# Patient Record
Sex: FEMALE | Race: WHITE | Employment: OTHER | URBAN - METROPOLITAN AREA
[De-identification: names, ages, dates, MRNs, and addresses within clinical notes are randomized per-mention and may not be internally consistent; named-entity substitution may affect disease eponyms.]

---

## 2017-12-12 NOTE — PROGRESS NOTES
[facility]  [address] [place] [id number]  [phone]  Dept: [phone]    PRE-OP EVALUATION:  Today's date: 2017    Ariane Reilly (: 1975) presents for pre-operative evaluation assessment as requested by Dr. Krishna Carreon.  She requires evaluation and anesthesia risk assessment prior to undergoing surgery/procedure for treatment of RA in Right Ankle .  Proposed procedure: Right Subtalar Fusion    Date of Surgery/ Procedure: 2018  Time of Surgery/ Procedure: 7:30am  Hospital/Surgical Facility: [facility]  Fax number for surgical facility: [phone]  Primary Physician: Tasha Tran  Type of Anesthesia Anticipated: to be determined    Patient has a Health Care Directive or Living Will:  NO    Preop Questions 2017   1.  Do you have a history of heart attack, stroke, stent, bypass or surgery on an artery in the head, neck, heart or legs? No   2.  Do you ever have any pain or discomfort in your chest? No   3.  Do you have a history of  Heart Failure? No   4.   Are you troubled by shortness of breath when:  walking on a level surface, or up a slight hill, or at night? No   5.  Do you currently have a cold, bronchitis or other respiratory infection? No   6.  Do you have a cough, shortness of breath, or wheezing? No   7.  Do you sometimes get pains in the calves of your legs when you walk? No   8. Do you or anyone in your family have previous history of blood clots? YES - Father   9.  Do you or does anyone in your family have a serious bleeding problem such as prolonged bleeding following surgeries or cuts? No   10. Have you ever had problems with anemia or been told to take iron pills? No   11. Have you had any abnormal blood loss such as black, tarry or bloody stools, or abnormal vaginal bleeding? No   12. Have you ever had a blood transfusion? No   13. Have you or any of your relatives ever had problems with anesthesia? No   14. Do you have  sleep apnea, excessive snoring or daytime drowsiness? No   15. Do you have any prosthetic heart valves? No   16. Do you have prosthetic joints? No   17. Is there any chance that you may be pregnant? No           HPI:                                                      Brief HPI related to upcoming procedure: Pt is Going to have above surgery due to ongoing pain and failed conservative Treatment.      Pt has RA and sees mariano Reddy    MEDICAL HISTORY:                                                    Patient Active Problem List    Diagnosis Date Noted     Dysmenorrhea 11/30/2016     Priority: Medium     Prehypertension 09/08/2016     Priority: Medium     Rheumatoid arthritis of foot (H) 10/08/2015     Priority: Medium     Arthritis of left ankle 10/02/2015     Priority: Medium     Obesity 10/02/2015     Priority: Medium     Rheumatoid arthritis (H) 05/28/2004     Priority: Medium     Problem list name updated by automated process. Provider to review        Past Medical History:   Diagnosis Date     Arthritis     rheumatoid arthritis     Gastro-oesophageal reflux disease      PONV (postoperative nausea and vomiting)      Past Surgical History:   Procedure Laterality Date     ARTHRODESIS ANKLE Left 10/8/2015    Procedure: ARTHRODESIS ANKLE;  Surgeon: Krishna Segal MD;  Location: [facility] OR     GRAFT BONE FROM ILIAC CREST Left 10/8/2015    Procedure: GRAFT BONE FROM ILIAC CREST;  Surgeon: Krishna Segal MD;  Location: [facility] OR     HC TOOTH EXTRACTION W/FORCEP       ORTHOPEDIC SURGERY      SYNOVECTOMIES BILATERAL FEET, AND RIGHT HAND, RIGHT RADIAL LUNATE FUSION.     Current Outpatient Prescriptions   Medication Sig Dispense Refill     naproxen (NAPROSYN) 500 MG tablet Take 1 tablet (500 mg) by mouth 2 times daily (with meals) 60 tablet 1     levonorgestrel (MIRENA) 20 MCG/24HR IUD Use one device, intrauterine, for up to 5 years. 1 each 0     Tocilizumab (ACTEMRA SC) Inject 162 mg Subcutaneous One injection every two  weeks       ibuprofen (ADVIL,MOTRIN) 600 MG tablet Take 1 tablet (600 mg) by mouth every 6 hours as needed for other (inflammatory pain) 60 tablet 0     LEFLUNOMIDE PO Take 20 mg by mouth every other day        Biotin (BIOTIN MAXIMUM STRENGTH) 10 MG TABS tablet Take 10,000 mcg by mouth daily       OTC products: None, except as noted above    Allergies   Allergen Reactions     No Known Allergies       Latex Allergy: NO    Social History   Substance Use Topics     Smoking status: Never Smoker     Smokeless tobacco: Never Used     Alcohol use Yes      Comment: sporadically     History   Drug Use No     Social History     Social History     Marital status:      Spouse name: N/A     Number of children: N/A     Years of education: 17     Occupational History     dental hygenist      Social History Main Topics     Smoking status: Never Smoker     Smokeless tobacco: Never Used     Alcohol use Yes      Comment: sporadically     Drug use: No     Sexual activity: Not Currently     Partners: Male     Birth control/ protection: Abstinence, Natural Family Planning     Other Topics Concern     Parent/Sibling W/ Cabg, Mi Or Angioplasty Before 65f 55m? No     Social History Narrative       Family History   Problem Relation Age of Onset     Hypertension Mother      Hyperlipidemia Mother      Asthma Mother      DIABETES Father      Arthritis Father      Other Cancer Father      Basal Cell Carcinoma     Obesity Father      Cancer - colorectal Other      great uncle on her mother's side     DIABETES Brother      Hypertension Brother      Hyperlipidemia Brother      Thyroid Disease Brother      Hypo, cancer     Obesity Sister      Obesity Brother        REVIEW OF SYSTEMS:                                                    C: NEGATIVE for fever, chills, change in weight  I: NEGATIVE for worrisome rashes, moles or lesions  E: NEGATIVE for vision changes or irritation  E/M: NEGATIVE for ear, mouth and throat problems  R: NEGATIVE for  "significant cough or SOB  B: NEGATIVE for masses, tenderness or discharge  CV: NEGATIVE for chest pain, palpitations or peripheral edema  GI: NEGATIVE for nausea, abdominal pain, heartburn, or change in bowel habits  : NEGATIVE for frequency, dysuria, or hematuria  MUSCULOSKELETAL:arthralgias  N: NEGATIVE for weakness, dizziness or paresthesias  E: NEGATIVE for temperature intolerance, skin/hair changes  H: NEGATIVE for bleeding problems  P: NEGATIVE for changes in mood or affect    EXAM:                                                    /84 (BP Location: Left arm, Patient Position: Chair, Cuff Size: Adult Large)  Pulse 66  Temp 97.7  F (36.5  C) (Oral)  Ht 5' 9\" (1.753 m)  Wt 274 lb (124.3 kg)  LMP 11/21/2017  SpO2 100%  BMI 40.46 kg/m2    GENERAL APPEARANCE: healthy, alert and no distress     EYES: EOMI, PERRL     HENT: ear canals and TM's normal and nose and mouth without ulcers or lesions     NECK: no adenopathy, no asymmetry, masses, or scars and thyroid normal to palpation     RESP: lungs clear to auscultation - no rales, rhonchi or wheezes     CV: regular rates and rhythm, normal S1 S2, no S3 or S4 and no murmur, click or rub     ABDOMEN:  soft, nontender, no HSM or masses and bowel sounds normal     MS: extremities normal- no gross deformities noted, no evidence of inflammation in joints, FROM in all extremities.     SKIN: no suspicious lesions or rashes     NEURO: Normal strength and tone, sensory exam grossly normal, mentation intact and speech normal     PSYCH: mentation appears normal. and affect normal/bright     LYMPHATICS: No axillary, cervical, or supraclavicular nodes    DIAGNOSTICS:                                                    Labs pending     Recent Labs   Lab Test  09/22/16   0921  10/02/15   1009  09/17/10   0410   HGB  15.1  14.4   --    PLT  294  316   --    NA   --   141   --    POTASSIUM   --   4.3   --    CR   --   0.70  0.73      hgb 14.3  Liver test normal   UA is " pending   IMPRESSION:                                                    Reason for surgery/procedure: as above  Diagnosis/reason for consult: Preop    The proposed surgical procedure is considered INTERMEDIATE risk.    REVISED CARDIAC RISK INDEX  The patient has the following serious cardiovascular risks for perioperative complications such as (MI, PE, VFib and 3  AV Block):  No serious cardiac risks  INTERPRETATION: 0 risks: Class I (very low risk - 0.4% complication rate)    The patient has the following additional risks for perioperative complications:  No identified additional risks      ICD-10-CM    1. Preop general physical exam Z01.818        RECOMMENDATIONS:                                                        --Patient is to take all scheduled medications on the day of surgery EXCEPT for modifications listed below.    Anticoagulant or Antiplatelet Medication Use  NSAIDS: Naproxen (Naprosyn):   Stop 2-3 days prior to surgery        Preoperative Joint replacement and Rheumatologic DMARD Use  The pending procedure is an intervertebral disc arthroplasty or a total joint arthroplasty at the hip (ZACARIAS), knee (TKA), ankle shoulder, wrist or elbow.    --Leflunomide: Stop for 2 weeks before and 2 weeks after the procedure  --Tocilizumab: Stop for 2-4 weeks before and 2-4 weeks after the procedure          APPROVAL GIVEN to proceed with proposed procedure, without further diagnostic evaluation       Signed Electronically by: Tasha Tran MD    Copy of this evaluation report is provided to requesting physician.    [place] Preop Guidelines

## 2017-12-12 NOTE — PATIENT INSTRUCTIONS
[facility]    If you have any questions regarding to your visit please contact your care team:       Team Red:   Clinic Hours Telephone Number   Dr. Melany Armstrong  (pediatrics)  Sadie Ochoa NP 7am-7pm  Monday - Thursday   7am-5pm  Fridays  [phone] (fax)    Joan GIRON  [phone]   Urgent Care - [place] and [place] Monday-Friday  [place] - 11am-8pm  Saturday-Sunday  Both sites - 9am-5pm  [phone] - [facility]  [phone] - [place]       What options do I have for visits at the clinic other than the traditional office visit?  To expand how we care for you, many of our providers are utilizing electronic visits (e-visits) and telephone visits, when medically appropriate, for interactions with their patients rather than a visit in the clinic.   We also offer nurse visits for many medical concerns. Just like any other service, we will bill your insurance company for this type of visit based on time spent on the phone with your provider. Not all insurance companies cover these visits. Please check with your medical insurance if this type of visit is covered. You will be responsible for any charges that are not paid by your insurance.      E-visits via [employer]:  generally incur a $35.00 fee.  Telephone visits:  Time spent on the phone: *charged based on time that is spent on the phone in increments of 10 minutes. Estimated cost:   5-10 mins $30.00   11-20 mins. $59.00   21-30 mins. $85.00     As always, Thank you for trusting us with your health care needs!              Before Your Surgery      Call your surgeon if there is any change in your health. This includes signs of a cold or flu (such as a sore throat, runny nose, cough, rash or fever).    Do not smoke, drink alcohol or take over the counter medicine (unless your surgeon or primary care doctor tells you to) for the 24 hours before and after surgery.    If you take  prescribed drugs: Follow your doctor s orders about which medicines to take and which to stop until after surgery.    Eating and drinking prior to surgery: follow the instructions from your surgeon    Take a shower or bath the night before surgery. Use the soap your surgeon gave you to gently clean your skin. If you do not have soap from your surgeon, use your regular soap. Do not shave or scrub the surgery site.  Wear clean pajamas and have clean sheets on your bed.     Discharge GILSON Dixon CMA

## 2017-12-14 ENCOUNTER — OFFICE VISIT (OUTPATIENT)
Dept: FAMILY MEDICINE | Facility: CLINIC | Age: 42
End: 2017-12-14
Payer: COMMERCIAL

## 2017-12-14 ENCOUNTER — TRANSFERRED RECORDS (OUTPATIENT)
Dept: HEALTH INFORMATION MANAGEMENT | Facility: CLINIC | Age: 42
End: 2017-12-14

## 2017-12-14 VITALS
TEMPERATURE: 97.7 F | BODY MASS INDEX: 40.58 KG/M2 | HEART RATE: 66 BPM | DIASTOLIC BLOOD PRESSURE: 84 MMHG | WEIGHT: 274 LBS | SYSTOLIC BLOOD PRESSURE: 136 MMHG | OXYGEN SATURATION: 100 % | HEIGHT: 69 IN

## 2017-12-14 DIAGNOSIS — E66.01 MORBID OBESITY (H): ICD-10-CM

## 2017-12-14 DIAGNOSIS — Z23 NEED FOR PROPHYLACTIC VACCINATION AND INOCULATION AGAINST INFLUENZA: ICD-10-CM

## 2017-12-14 DIAGNOSIS — M06.9 RHEUMATOID ARTHRITIS, INVOLVING UNSPECIFIED SITE, UNSPECIFIED RHEUMATOID FACTOR PRESENCE: ICD-10-CM

## 2017-12-14 DIAGNOSIS — Z01.818 PREOP GENERAL PHYSICAL EXAM: Primary | ICD-10-CM

## 2017-12-14 LAB
ALBUMIN UR-MCNC: NEGATIVE MG/DL
ALT SERPL-CCNC: 25 IU/L (ref 5–35)
APPEARANCE UR: CLEAR
AST SERPL-CCNC: 16 U/L (ref 5–34)
BILIRUB UR QL STRIP: NEGATIVE
COLOR UR AUTO: YELLOW
CREAT SERPL-MCNC: 0.54 MG/DL (ref 0.5–1.3)
GFR SERPL CREATININE-BSD FRML MDRD: 134 ML/MIN/1.73M2
GLUCOSE UR STRIP-MCNC: NEGATIVE MG/DL
HGB UR QL STRIP: NEGATIVE
KETONES UR STRIP-MCNC: NEGATIVE MG/DL
LEUKOCYTE ESTERASE UR QL STRIP: NEGATIVE
NITRATE UR QL: NEGATIVE
PH UR STRIP: 8 PH (ref 5–7)
SOURCE: ABNORMAL
SP GR UR STRIP: 1.01 (ref 1–1.03)
UROBILINOGEN UR STRIP-ACNC: 0.2 EU/DL (ref 0.2–1)

## 2017-12-14 PROCEDURE — 81003 URINALYSIS AUTO W/O SCOPE: CPT | Performed by: FAMILY MEDICINE

## 2017-12-14 PROCEDURE — 90686 IIV4 VACC NO PRSV 0.5 ML IM: CPT | Performed by: FAMILY MEDICINE

## 2017-12-14 PROCEDURE — 90471 IMMUNIZATION ADMIN: CPT | Performed by: FAMILY MEDICINE

## 2017-12-14 PROCEDURE — 99214 OFFICE O/P EST MOD 30 MIN: CPT | Mod: 25 | Performed by: FAMILY MEDICINE

## 2017-12-14 NOTE — MR AVS SNAPSHOT
After Visit Summary   12/14/2017    Ariane Reilly    MRN: [id number]           Patient Information     Date Of Birth          1975        Visit Information        Provider Department      12/14/2017 2:00 PM Tasha Tran MD [facility]        Today's Diagnoses     Preop general physical exam    -  1    Rheumatoid arthritis, involving unspecified site, unspecified rheumatoid factor presence (H)          Care Instructions    [facility]    If you have any questions regarding to your visit please contact your care team:       Team Red:   Clinic Hours Telephone Number   Dr. Melany Armstrong  (pediatrics)  Sadie Ochoa NP 7am-7pm  Monday - Thursday   7am-5pm  Fridays  [phone] (fax)    Joan GIRON  [phone]   Urgent Care - [place] and [place] Monday-Friday  [place] - 11am-8pm  Saturday-Sunday  Both sites - 9am-5pm  [phone] - [facility]  [phone] - [place]       What options do I have for visits at the clinic other than the traditional office visit?  To expand how we care for you, many of our providers are utilizing electronic visits (e-visits) and telephone visits, when medically appropriate, for interactions with their patients rather than a visit in the clinic.   We also offer nurse visits for many medical concerns. Just like any other service, we will bill your insurance company for this type of visit based on time spent on the phone with your provider. Not all insurance companies cover these visits. Please check with your medical insurance if this type of visit is covered. You will be responsible for any charges that are not paid by your insurance.      E-visits via [employer]:  generally incur a $35.00 fee.  Telephone visits:  Time spent on the phone: *charged based on time that is spent on the phone in increments of 10 minutes. Estimated cost:   5-10 mins $30.00   11-20 mins. $59.00    21-30 mins. $85.00     As always, Thank you for trusting us with your health care needs!              Before Your Surgery      Call your surgeon if there is any change in your health. This includes signs of a cold or flu (such as a sore throat, runny nose, cough, rash or fever).    Do not smoke, drink alcohol or take over the counter medicine (unless your surgeon or primary care doctor tells you to) for the 24 hours before and after surgery.    If you take prescribed drugs: Follow your doctor s orders about which medicines to take and which to stop until after surgery.    Eating and drinking prior to surgery: follow the instructions from your surgeon    Take a shower or bath the night before surgery. Use the soap your surgeon gave you to gently clean your skin. If you do not have soap from your surgeon, use your regular soap. Do not shave or scrub the surgery site.  Wear clean pajamas and have clean sheets on your bed.     Discharge GILSON Dixon CMA            Follow-ups after your visit        Who to contact     If you have questions or need follow up information about today's clinic visit or your schedule please contact [facility] directly at [phone].  Normal or non-critical lab and imaging results will be communicated to you by [employer], letter or phone within 4 business days after the clinic has received the results. If you do not hear from us within 7 days, please contact the clinic through [employer] or phone. If you have a critical or abnormal lab result, we will notify you by phone as soon as possible.  Submit refill requests through [employer] or call your pharmacy and they will forward the refill request to us. Please allow 3 business days for your refill to be completed.          Additional Information About Your Visit        [employer] Information     [employer] gives you secure access to your electronic health record. If you see a primary care provider, you can also send messages to your  "care team and make appointments. If you have questions, please call your primary care clinic.  If you do not have a primary care provider, please call [phone] and they will assist you.        Care EveryWhere ID     This is your Care EveryWhere ID. This could be used by other organizations to access your [facility] medical records  [id number]        Your Vitals Were     Pulse Temperature Height Last Period Pulse Oximetry BMI (Body Mass Index)    66 97.7  F (36.5  C) (Oral) 5' 9\" (1.753 m) 11/21/2017 100% 40.46 kg/m2       Blood Pressure from Last 3 Encounters:   12/14/17 136/84   12/21/16 150/81   12/19/16 149/90    Weight from Last 3 Encounters:   12/14/17 274 lb (124.3 kg)   12/21/16 242 lb (109.8 kg)   12/19/16 242 lb 8.1 oz (110 kg)              We Performed the Following     *UA reflex to Microscopic and Culture ([place] and [facility] (except [place] and [place])        Primary Care Provider Office Phone # Fax #    Tasha Tran [phone] [address] [facility] [address]        Equal Access to Services     ALISHA SCHWAB : Hadii aad ku hadasho Soomaali, waaxda luqadaha, qaybta kaalmada adeegyada, justin bland. So [facility] [phone].    ATENCIÓN: Si habla español, tiene a levine disposición servicios gratuitos de asistencia lingüística. Llame al [phone].    We comply with applicable federal civil rights laws and [place] laws. We do not discriminate on the basis of race, color, national origin, age, disability, sex, sexual orientation, or gender identity.            Thank you!     Thank you for choosing [facility]  for your care. Our goal is always to provide you with excellent care. Hearing back from our patients is one way we can continue to improve our services. Please take a few minutes to complete the written survey that you may receive in the mail after your visit with us. Thank you!             Your Updated Medication " List - Protect others around you: Learn how to safely use, store and throw away your medicines at www.disposemymeds.org.          This list is accurate as of: 12/14/17  2:36 PM.  Always use your most recent med list.                   Brand Name Dispense Instructions for use Diagnosis    ACTEMRA SC      Inject 162 mg Subcutaneous One injection every two weeks        BIOTIN MAXIMUM STRENGTH 10 MG Tabs tablet   Generic drug:  Biotin      Take 10,000 mcg by mouth daily    Dry skin, Female infertility       ibuprofen 600 MG tablet    ADVIL/MOTRIN    60 tablet    Take 1 tablet (600 mg) by mouth every 6 hours as needed for other (inflammatory pain)    Rheumatoid arthritis of foot (H)       LEFLUNOMIDE PO      Take 20 mg by mouth every other day        levonorgestrel 20 MCG/24HR IUD    MIRENA    1 each    Use one device, intrauterine, for up to 5 years.    Encounter for IUD insertion       naproxen 500 MG tablet    NAPROSYN    60 tablet    Take 1 tablet (500 mg) by mouth 2 times daily (with meals)    Dysmenorrhea, Uterine cramping

## 2017-12-14 NOTE — NURSING NOTE
"Chief Complaint   Patient presents with     Pre-Op Exam       Initial /84 (BP Location: Left arm, Patient Position: Chair, Cuff Size: Adult Large)  Pulse 66  Temp 97.7  F (36.5  C) (Oral)  Ht 5' 9\" (1.753 m)  Wt 274 lb (124.3 kg)  LMP 11/21/2017  SpO2 100%  BMI 40.46 kg/m2 Estimated body mass index is 40.46 kg/(m^2) as calculated from the following:    Height as of this encounter: 5' 9\" (1.753 m).    Weight as of this encounter: 274 lb (124.3 kg).  Medication Reconciliation: complete    "

## 2017-12-15 NOTE — PROGRESS NOTES

## 2017-12-29 NOTE — H&P (VIEW-ONLY)
[facility]  [address] [place] [id number]  [phone]  Dept: [phone]    PRE-OP EVALUATION:  Today's date: 2017    Ariane Reilly (: 1975) presents for pre-operative evaluation assessment as requested by Dr. Krishna Carreon.  She requires evaluation and anesthesia risk assessment prior to undergoing surgery/procedure for treatment of RA in Right Ankle .  Proposed procedure: Right Subtalar Fusion    Date of Surgery/ Procedure: 2018  Time of Surgery/ Procedure: 7:30am  Hospital/Surgical Facility: [facility]  Fax number for surgical facility: [phone]  Primary Physician: Tasha Tran  Type of Anesthesia Anticipated: to be determined    Patient has a Health Care Directive or Living Will:  NO    Preop Questions 2017   1.  Do you have a history of heart attack, stroke, stent, bypass or surgery on an artery in the head, neck, heart or legs? No   2.  Do you ever have any pain or discomfort in your chest? No   3.  Do you have a history of  Heart Failure? No   4.   Are you troubled by shortness of breath when:  walking on a level surface, or up a slight hill, or at night? No   5.  Do you currently have a cold, bronchitis or other respiratory infection? No   6.  Do you have a cough, shortness of breath, or wheezing? No   7.  Do you sometimes get pains in the calves of your legs when you walk? No   8. Do you or anyone in your family have previous history of blood clots? YES - Father   9.  Do you or does anyone in your family have a serious bleeding problem such as prolonged bleeding following surgeries or cuts? No   10. Have you ever had problems with anemia or been told to take iron pills? No   11. Have you had any abnormal blood loss such as black, tarry or bloody stools, or abnormal vaginal bleeding? No   12. Have you ever had a blood transfusion? No   13. Have you or any of your relatives ever had problems with anesthesia? No   14. Do you have  sleep apnea, excessive snoring or daytime drowsiness? No   15. Do you have any prosthetic heart valves? No   16. Do you have prosthetic joints? No   17. Is there any chance that you may be pregnant? No           HPI:                                                      Brief HPI related to upcoming procedure: Pt is Going to have above surgery due to ongoing pain and failed conservative Treatment.      Pt has RA and sees mariano Reddy    MEDICAL HISTORY:                                                    Patient Active Problem List    Diagnosis Date Noted     Dysmenorrhea 11/30/2016     Priority: Medium     Prehypertension 09/08/2016     Priority: Medium     Rheumatoid arthritis of foot (H) 10/08/2015     Priority: Medium     Arthritis of left ankle 10/02/2015     Priority: Medium     Obesity 10/02/2015     Priority: Medium     Rheumatoid arthritis (H) 05/28/2004     Priority: Medium     Problem list name updated by automated process. Provider to review        Past Medical History:   Diagnosis Date     Arthritis     rheumatoid arthritis     Gastro-oesophageal reflux disease      PONV (postoperative nausea and vomiting)      Past Surgical History:   Procedure Laterality Date     ARTHRODESIS ANKLE Left 10/8/2015    Procedure: ARTHRODESIS ANKLE;  Surgeon: Krishna Segal MD;  Location: [facility] OR     GRAFT BONE FROM ILIAC CREST Left 10/8/2015    Procedure: GRAFT BONE FROM ILIAC CREST;  Surgeon: Krishna Segal MD;  Location: [facility] OR     HC TOOTH EXTRACTION W/FORCEP       ORTHOPEDIC SURGERY      SYNOVECTOMIES BILATERAL FEET, AND RIGHT HAND, RIGHT RADIAL LUNATE FUSION.     Current Outpatient Prescriptions   Medication Sig Dispense Refill     naproxen (NAPROSYN) 500 MG tablet Take 1 tablet (500 mg) by mouth 2 times daily (with meals) 60 tablet 1     levonorgestrel (MIRENA) 20 MCG/24HR IUD Use one device, intrauterine, for up to 5 years. 1 each 0     Tocilizumab (ACTEMRA SC) Inject 162 mg Subcutaneous One injection every two  weeks       ibuprofen (ADVIL,MOTRIN) 600 MG tablet Take 1 tablet (600 mg) by mouth every 6 hours as needed for other (inflammatory pain) 60 tablet 0     LEFLUNOMIDE PO Take 20 mg by mouth every other day        Biotin (BIOTIN MAXIMUM STRENGTH) 10 MG TABS tablet Take 10,000 mcg by mouth daily       OTC products: None, except as noted above    Allergies   Allergen Reactions     No Known Allergies       Latex Allergy: NO    Social History   Substance Use Topics     Smoking status: Never Smoker     Smokeless tobacco: Never Used     Alcohol use Yes      Comment: sporadically     History   Drug Use No     Social History     Social History     Marital status:      Spouse name: N/A     Number of children: N/A     Years of education: 17     Occupational History     dental hygenist      Social History Main Topics     Smoking status: Never Smoker     Smokeless tobacco: Never Used     Alcohol use Yes      Comment: sporadically     Drug use: No     Sexual activity: Not Currently     Partners: Male     Birth control/ protection: Abstinence, Natural Family Planning     Other Topics Concern     Parent/Sibling W/ Cabg, Mi Or Angioplasty Before 65f 55m? No     Social History Narrative       Family History   Problem Relation Age of Onset     Hypertension Mother      Hyperlipidemia Mother      Asthma Mother      DIABETES Father      Arthritis Father      Other Cancer Father      Basal Cell Carcinoma     Obesity Father      Cancer - colorectal Other      great uncle on her mother's side     DIABETES Brother      Hypertension Brother      Hyperlipidemia Brother      Thyroid Disease Brother      Hypo, cancer     Obesity Sister      Obesity Brother        REVIEW OF SYSTEMS:                                                    C: NEGATIVE for fever, chills, change in weight  I: NEGATIVE for worrisome rashes, moles or lesions  E: NEGATIVE for vision changes or irritation  E/M: NEGATIVE for ear, mouth and throat problems  R: NEGATIVE for  "significant cough or SOB  B: NEGATIVE for masses, tenderness or discharge  CV: NEGATIVE for chest pain, palpitations or peripheral edema  GI: NEGATIVE for nausea, abdominal pain, heartburn, or change in bowel habits  : NEGATIVE for frequency, dysuria, or hematuria  MUSCULOSKELETAL:arthralgias  N: NEGATIVE for weakness, dizziness or paresthesias  E: NEGATIVE for temperature intolerance, skin/hair changes  H: NEGATIVE for bleeding problems  P: NEGATIVE for changes in mood or affect    EXAM:                                                    /84 (BP Location: Left arm, Patient Position: Chair, Cuff Size: Adult Large)  Pulse 66  Temp 97.7  F (36.5  C) (Oral)  Ht 5' 9\" (1.753 m)  Wt 274 lb (124.3 kg)  LMP 11/21/2017  SpO2 100%  BMI 40.46 kg/m2    GENERAL APPEARANCE: healthy, alert and no distress     EYES: EOMI, PERRL     HENT: ear canals and TM's normal and nose and mouth without ulcers or lesions     NECK: no adenopathy, no asymmetry, masses, or scars and thyroid normal to palpation     RESP: lungs clear to auscultation - no rales, rhonchi or wheezes     CV: regular rates and rhythm, normal S1 S2, no S3 or S4 and no murmur, click or rub     ABDOMEN:  soft, nontender, no HSM or masses and bowel sounds normal     MS: extremities normal- no gross deformities noted, no evidence of inflammation in joints, FROM in all extremities.     SKIN: no suspicious lesions or rashes     NEURO: Normal strength and tone, sensory exam grossly normal, mentation intact and speech normal     PSYCH: mentation appears normal. and affect normal/bright     LYMPHATICS: No axillary, cervical, or supraclavicular nodes    DIAGNOSTICS:                                                    Labs pending     Recent Labs   Lab Test  09/22/16   0921  10/02/15   1009  09/17/10   0410   HGB  15.1  14.4   --    PLT  294  316   --    NA   --   141   --    POTASSIUM   --   4.3   --    CR   --   0.70  0.73      hgb 14.3  Liver test normal   UA is " pending   IMPRESSION:                                                    Reason for surgery/procedure: as above  Diagnosis/reason for consult: Preop    The proposed surgical procedure is considered INTERMEDIATE risk.    REVISED CARDIAC RISK INDEX  The patient has the following serious cardiovascular risks for perioperative complications such as (MI, PE, VFib and 3  AV Block):  No serious cardiac risks  INTERPRETATION: 0 risks: Class I (very low risk - 0.4% complication rate)    The patient has the following additional risks for perioperative complications:  No identified additional risks      ICD-10-CM    1. Preop general physical exam Z01.818        RECOMMENDATIONS:                                                        --Patient is to take all scheduled medications on the day of surgery EXCEPT for modifications listed below.    Anticoagulant or Antiplatelet Medication Use  NSAIDS: Naproxen (Naprosyn):   Stop 2-3 days prior to surgery        Preoperative Joint replacement and Rheumatologic DMARD Use  The pending procedure is an intervertebral disc arthroplasty or a total joint arthroplasty at the hip (ZACARIAS), knee (TKA), ankle shoulder, wrist or elbow.    --Leflunomide: Stop for 2 weeks before and 2 weeks after the procedure  --Tocilizumab: Stop for 2-4 weeks before and 2-4 weeks after the procedure          APPROVAL GIVEN to proceed with proposed procedure, without further diagnostic evaluation       Signed Electronically by: Tasha Tran MD    Copy of this evaluation report is provided to requesting physician.    [facility] Preop Guidelines

## 2018-01-04 ENCOUNTER — ANESTHESIA EVENT (OUTPATIENT)
Dept: SURGERY | Facility: CLINIC | Age: 43
End: 2018-01-04
Payer: COMMERCIAL

## 2018-01-04 ENCOUNTER — APPOINTMENT (OUTPATIENT)
Dept: GENERAL RADIOLOGY | Facility: CLINIC | Age: 43
End: 2018-01-04
Attending: ORTHOPAEDIC SURGERY
Payer: COMMERCIAL

## 2018-01-04 ENCOUNTER — ANESTHESIA (OUTPATIENT)
Dept: SURGERY | Facility: CLINIC | Age: 43
End: 2018-01-04
Payer: COMMERCIAL

## 2018-01-04 ENCOUNTER — SURGERY (OUTPATIENT)
Age: 43
End: 2018-01-04
Payer: COMMERCIAL

## 2018-01-04 ENCOUNTER — HOSPITAL ENCOUNTER (OUTPATIENT)
Facility: CLINIC | Age: 43
Discharge: HOME | End: 2018-01-05
Attending: ORTHOPAEDIC SURGERY | Admitting: ORTHOPAEDIC SURGERY
Payer: COMMERCIAL

## 2018-01-04 DIAGNOSIS — M05.9 RHEUMATOID ARTHRITIS WITH POSITIVE RHEUMATOID FACTOR, INVOLVING UNSPECIFIED SITE (H): Primary | ICD-10-CM

## 2018-01-04 PROBLEM — M06.9 RHEUMATOID ARTHRITIS, ADULT (H): Status: ACTIVE | Noted: 2018-01-04

## 2018-01-04 LAB — HCG UR QL: NEGATIVE

## 2018-01-04 PROCEDURE — 25000128 H RX IP 250 OP 636: Performed by: NURSE ANESTHETIST, CERTIFIED REGISTERED

## 2018-01-04 PROCEDURE — 40000986 XR FOOT PORT RT 2 VW: Mod: RT

## 2018-01-04 PROCEDURE — 40000935 ZZH STATISTIC OUTPATIENT (NON-OBS) EVE

## 2018-01-04 PROCEDURE — 81025 URINE PREGNANCY TEST: CPT | Performed by: ANESTHESIOLOGY

## 2018-01-04 PROCEDURE — 25000132 ZZH RX MED GY IP 250 OP 250 PS 637: Performed by: ORTHOPAEDIC SURGERY

## 2018-01-04 PROCEDURE — 25000125 ZZHC RX 250: Performed by: ANESTHESIOLOGY

## 2018-01-04 PROCEDURE — 37000008 ZZH ANESTHESIA TECHNICAL FEE, 1ST 30 MIN: Performed by: ORTHOPAEDIC SURGERY

## 2018-01-04 PROCEDURE — 27210995 ZZH RX 272: Performed by: ORTHOPAEDIC SURGERY

## 2018-01-04 PROCEDURE — 25000125 ZZHC RX 250: Performed by: ORTHOPAEDIC SURGERY

## 2018-01-04 PROCEDURE — 25000128 H RX IP 250 OP 636

## 2018-01-04 PROCEDURE — 40000934 ZZH STATISTIC OUTPATIENT (NON-OBS) DAY

## 2018-01-04 PROCEDURE — 25000128 H RX IP 250 OP 636: Performed by: ANESTHESIOLOGY

## 2018-01-04 PROCEDURE — 40000170 ZZH STATISTIC PRE-PROCEDURE ASSESSMENT II: Performed by: ORTHOPAEDIC SURGERY

## 2018-01-04 PROCEDURE — 25000125 ZZHC RX 250: Performed by: NURSE ANESTHETIST, CERTIFIED REGISTERED

## 2018-01-04 PROCEDURE — C1713 ANCHOR/SCREW BN/BN,TIS/BN: HCPCS | Performed by: ORTHOPAEDIC SURGERY

## 2018-01-04 PROCEDURE — 25000125 ZZHC RX 250

## 2018-01-04 PROCEDURE — 27210794 ZZH OR GENERAL SUPPLY STERILE: Performed by: ORTHOPAEDIC SURGERY

## 2018-01-04 PROCEDURE — 25000128 H RX IP 250 OP 636: Performed by: ORTHOPAEDIC SURGERY

## 2018-01-04 PROCEDURE — 27110028 ZZH OR GENERAL SUPPLY NON-STERILE: Performed by: ORTHOPAEDIC SURGERY

## 2018-01-04 PROCEDURE — 71000012 ZZH RECOVERY PHASE 1 LEVEL 1 FIRST HR: Performed by: ORTHOPAEDIC SURGERY

## 2018-01-04 PROCEDURE — 37000009 ZZH ANESTHESIA TECHNICAL FEE, EACH ADDTL 15 MIN: Performed by: ORTHOPAEDIC SURGERY

## 2018-01-04 PROCEDURE — 40000936 ZZH STATISTIC OUTPATIENT (NON-OBS) NIGHT

## 2018-01-04 PROCEDURE — 36000058 ZZH SURGERY LEVEL 3 EA 15 ADDTL MIN: Performed by: ORTHOPAEDIC SURGERY

## 2018-01-04 PROCEDURE — 36000056 ZZH SURGERY LEVEL 3 1ST 30 MIN: Performed by: ORTHOPAEDIC SURGERY

## 2018-01-04 DEVICE — IMPLANTABLE DEVICE: Type: IMPLANTABLE DEVICE | Site: FOOT | Status: FUNCTIONAL

## 2018-01-04 RX ORDER — EPHEDRINE SULFATE 50 MG/ML
INJECTION, SOLUTION INTRAMUSCULAR; INTRAVENOUS; SUBCUTANEOUS PRN
Status: DISCONTINUED | OUTPATIENT
Start: 2018-01-04 | End: 2018-01-04

## 2018-01-04 RX ORDER — CEFAZOLIN SODIUM 1 G/50ML
3 SOLUTION INTRAVENOUS
Status: COMPLETED | OUTPATIENT
Start: 2018-01-04 | End: 2018-01-04

## 2018-01-04 RX ORDER — KETOROLAC TROMETHAMINE 30 MG/ML
30 INJECTION, SOLUTION INTRAMUSCULAR; INTRAVENOUS EVERY 6 HOURS
Status: DISCONTINUED | OUTPATIENT
Start: 2018-01-04 | End: 2018-01-05 | Stop reason: HOSPADM

## 2018-01-04 RX ORDER — CEFAZOLIN SODIUM 2 G/100ML
2 INJECTION, SOLUTION INTRAVENOUS EVERY 8 HOURS
Status: DISCONTINUED | OUTPATIENT
Start: 2018-01-04 | End: 2018-01-05

## 2018-01-04 RX ORDER — PROCHLORPERAZINE MALEATE 10 MG
10 TABLET ORAL EVERY 6 HOURS PRN
Status: DISCONTINUED | OUTPATIENT
Start: 2018-01-04 | End: 2018-01-05 | Stop reason: HOSPADM

## 2018-01-04 RX ORDER — SODIUM CHLORIDE, SODIUM LACTATE, POTASSIUM CHLORIDE, CALCIUM CHLORIDE 600; 310; 30; 20 MG/100ML; MG/100ML; MG/100ML; MG/100ML
INJECTION, SOLUTION INTRAVENOUS CONTINUOUS
Status: DISCONTINUED | OUTPATIENT
Start: 2018-01-04 | End: 2018-01-05 | Stop reason: CLARIF

## 2018-01-04 RX ORDER — HYDROMORPHONE HYDROCHLORIDE 1 MG/ML
.3-.5 INJECTION, SOLUTION INTRAMUSCULAR; INTRAVENOUS; SUBCUTANEOUS EVERY 10 MIN PRN
Status: DISCONTINUED | OUTPATIENT
Start: 2018-01-04 | End: 2018-01-04 | Stop reason: HOSPADM

## 2018-01-04 RX ORDER — PROPOFOL 10 MG/ML
INJECTION, EMULSION INTRAVENOUS PRN
Status: DISCONTINUED | OUTPATIENT
Start: 2018-01-04 | End: 2018-01-04

## 2018-01-04 RX ORDER — METOCLOPRAMIDE 10 MG/1
10 TABLET ORAL EVERY 6 HOURS PRN
Status: DISCONTINUED | OUTPATIENT
Start: 2018-01-04 | End: 2018-01-05 | Stop reason: HOSPADM

## 2018-01-04 RX ORDER — ONDANSETRON 2 MG/ML
4 INJECTION INTRAMUSCULAR; INTRAVENOUS EVERY 30 MIN PRN
Status: DISCONTINUED | OUTPATIENT
Start: 2018-01-04 | End: 2018-01-04 | Stop reason: HOSPADM

## 2018-01-04 RX ORDER — BIOTIN 1 MG
5000 TABLET ORAL DAILY
Status: DISCONTINUED | OUTPATIENT
Start: 2018-01-04 | End: 2018-01-05 | Stop reason: CLARIF

## 2018-01-04 RX ORDER — FENTANYL CITRATE 50 UG/ML
25-50 INJECTION, SOLUTION INTRAMUSCULAR; INTRAVENOUS
Status: COMPLETED | OUTPATIENT
Start: 2018-01-04 | End: 2018-01-04

## 2018-01-04 RX ORDER — IBUPROFEN 600 MG/1
600 TABLET, FILM COATED ORAL EVERY 6 HOURS PRN
Qty: 60 TABLET | Refills: 0 | Status: SHIPPED | OUTPATIENT
Start: 2018-01-04

## 2018-01-04 RX ORDER — PROPOFOL 10 MG/ML
INJECTION, EMULSION INTRAVENOUS CONTINUOUS PRN
Status: DISCONTINUED | OUTPATIENT
Start: 2018-01-04 | End: 2018-01-04

## 2018-01-04 RX ORDER — DEXAMETHASONE SODIUM PHOSPHATE 4 MG/ML
INJECTION, SOLUTION INTRA-ARTICULAR; INTRALESIONAL; INTRAMUSCULAR; INTRAVENOUS; SOFT TISSUE PRN
Status: DISCONTINUED | OUTPATIENT
Start: 2018-01-04 | End: 2018-01-04

## 2018-01-04 RX ORDER — ONDANSETRON 4 MG/1
4 TABLET, ORALLY DISINTEGRATING ORAL EVERY 6 HOURS PRN
Qty: 30 TABLET | Refills: 0 | Status: SHIPPED | OUTPATIENT
Start: 2018-01-04 | End: 2018-09-20

## 2018-01-04 RX ORDER — OXYCODONE HYDROCHLORIDE 5 MG/1
5-10 TABLET ORAL
Status: DISCONTINUED | OUTPATIENT
Start: 2018-01-04 | End: 2018-01-05 | Stop reason: HOSPADM

## 2018-01-04 RX ORDER — NALOXONE HYDROCHLORIDE 0.4 MG/ML
.1-.4 INJECTION, SOLUTION INTRAMUSCULAR; INTRAVENOUS; SUBCUTANEOUS
Status: DISCONTINUED | OUTPATIENT
Start: 2018-01-04 | End: 2018-01-05 | Stop reason: HOSPADM

## 2018-01-04 RX ORDER — KETOROLAC TROMETHAMINE 30 MG/ML
30 INJECTION, SOLUTION INTRAMUSCULAR; INTRAVENOUS EVERY 6 HOURS
Status: DISCONTINUED | OUTPATIENT
Start: 2018-01-04 | End: 2018-01-04

## 2018-01-04 RX ORDER — SCOLOPAMINE TRANSDERMAL SYSTEM 1 MG/1
1 PATCH, EXTENDED RELEASE TRANSDERMAL
Status: DISCONTINUED | OUTPATIENT
Start: 2018-01-04 | End: 2018-01-04 | Stop reason: HOSPADM

## 2018-01-04 RX ORDER — ONDANSETRON 2 MG/ML
4 INJECTION INTRAMUSCULAR; INTRAVENOUS EVERY 6 HOURS PRN
Status: DISCONTINUED | OUTPATIENT
Start: 2018-01-04 | End: 2018-01-05 | Stop reason: HOSPADM

## 2018-01-04 RX ORDER — SODIUM CHLORIDE, SODIUM LACTATE, POTASSIUM CHLORIDE, CALCIUM CHLORIDE 600; 310; 30; 20 MG/100ML; MG/100ML; MG/100ML; MG/100ML
INJECTION, SOLUTION INTRAVENOUS CONTINUOUS
Status: DISCONTINUED | OUTPATIENT
Start: 2018-01-04 | End: 2018-01-04 | Stop reason: HOSPADM

## 2018-01-04 RX ORDER — HYDROXYZINE HYDROCHLORIDE 25 MG/1
25 TABLET, FILM COATED ORAL EVERY 6 HOURS PRN
Qty: 50 TABLET | Refills: 0 | Status: SHIPPED | OUTPATIENT
Start: 2018-01-04 | End: 2018-09-20

## 2018-01-04 RX ORDER — MEPERIDINE HYDROCHLORIDE 25 MG/ML
12.5 INJECTION INTRAMUSCULAR; INTRAVENOUS; SUBCUTANEOUS
Status: DISCONTINUED | OUTPATIENT
Start: 2018-01-04 | End: 2018-01-04 | Stop reason: HOSPADM

## 2018-01-04 RX ORDER — KETOROLAC TROMETHAMINE 30 MG/ML
INJECTION, SOLUTION INTRAMUSCULAR; INTRAVENOUS PRN
Status: DISCONTINUED | OUTPATIENT
Start: 2018-01-04 | End: 2018-01-04

## 2018-01-04 RX ORDER — ONDANSETRON 4 MG/1
4 TABLET, ORALLY DISINTEGRATING ORAL EVERY 6 HOURS PRN
Status: DISCONTINUED | OUTPATIENT
Start: 2018-01-04 | End: 2018-01-05 | Stop reason: HOSPADM

## 2018-01-04 RX ORDER — FENTANYL CITRATE 50 UG/ML
25-50 INJECTION, SOLUTION INTRAMUSCULAR; INTRAVENOUS
Status: DISCONTINUED | OUTPATIENT
Start: 2018-01-04 | End: 2018-01-04 | Stop reason: HOSPADM

## 2018-01-04 RX ORDER — BUPIVACAINE HYDROCHLORIDE 2.5 MG/ML
INJECTION, SOLUTION EPIDURAL; INFILTRATION; INTRACAUDAL PRN
Status: DISCONTINUED | OUTPATIENT
Start: 2018-01-04 | End: 2018-01-04 | Stop reason: HOSPADM

## 2018-01-04 RX ORDER — HYDROMORPHONE HYDROCHLORIDE 1 MG/ML
INJECTION, SOLUTION INTRAMUSCULAR; INTRAVENOUS; SUBCUTANEOUS
Status: COMPLETED
Start: 2018-01-04 | End: 2018-01-04

## 2018-01-04 RX ORDER — OXYCODONE HYDROCHLORIDE 5 MG/1
5-10 TABLET ORAL
Qty: 60 TABLET | Refills: 0 | Status: SHIPPED | OUTPATIENT
Start: 2018-01-04 | End: 2018-09-20

## 2018-01-04 RX ORDER — CEFAZOLIN SODIUM 1 G
1 VIAL (EA) INJECTION SEE ADMIN INSTRUCTIONS
Status: DISCONTINUED | OUTPATIENT
Start: 2018-01-04 | End: 2018-01-04

## 2018-01-04 RX ORDER — ONDANSETRON 4 MG/1
4 TABLET, ORALLY DISINTEGRATING ORAL EVERY 30 MIN PRN
Status: DISCONTINUED | OUTPATIENT
Start: 2018-01-04 | End: 2018-01-04 | Stop reason: HOSPADM

## 2018-01-04 RX ORDER — ASPIRIN 325 MG/1
325 TABLET, FILM COATED ORAL DAILY
Status: DISCONTINUED | OUTPATIENT
Start: 2018-01-05 | End: 2018-01-05 | Stop reason: HOSPADM

## 2018-01-04 RX ORDER — ONDANSETRON 2 MG/ML
INJECTION INTRAMUSCULAR; INTRAVENOUS PRN
Status: DISCONTINUED | OUTPATIENT
Start: 2018-01-04 | End: 2018-01-04

## 2018-01-04 RX ORDER — ACETAMINOPHEN 325 MG/1
650 TABLET ORAL EVERY 6 HOURS PRN
Status: DISCONTINUED | OUTPATIENT
Start: 2018-01-04 | End: 2018-01-05 | Stop reason: HOSPADM

## 2018-01-04 RX ORDER — NALOXONE HYDROCHLORIDE 0.4 MG/ML
.1-.4 INJECTION, SOLUTION INTRAMUSCULAR; INTRAVENOUS; SUBCUTANEOUS
Status: DISCONTINUED | OUTPATIENT
Start: 2018-01-04 | End: 2018-01-04 | Stop reason: HOSPADM

## 2018-01-04 RX ORDER — KETOROLAC TROMETHAMINE 30 MG/ML
30 INJECTION, SOLUTION INTRAMUSCULAR; INTRAVENOUS EVERY 6 HOURS PRN
Status: DISCONTINUED | OUTPATIENT
Start: 2018-01-04 | End: 2018-01-04

## 2018-01-04 RX ORDER — HYDROMORPHONE HYDROCHLORIDE 1 MG/ML
.3-.5 INJECTION, SOLUTION INTRAMUSCULAR; INTRAVENOUS; SUBCUTANEOUS
Status: DISCONTINUED | OUTPATIENT
Start: 2018-01-04 | End: 2018-01-04

## 2018-01-04 RX ORDER — METOCLOPRAMIDE HYDROCHLORIDE 5 MG/ML
10 INJECTION INTRAMUSCULAR; INTRAVENOUS EVERY 6 HOURS PRN
Status: DISCONTINUED | OUTPATIENT
Start: 2018-01-04 | End: 2018-01-05 | Stop reason: HOSPADM

## 2018-01-04 RX ORDER — LIDOCAINE HYDROCHLORIDE 20 MG/ML
INJECTION, SOLUTION INFILTRATION; PERINEURAL PRN
Status: DISCONTINUED | OUTPATIENT
Start: 2018-01-04 | End: 2018-01-04

## 2018-01-04 RX ORDER — IBUPROFEN 600 MG/1
600 TABLET, FILM COATED ORAL EVERY 6 HOURS PRN
Status: DISCONTINUED | OUTPATIENT
Start: 2018-01-04 | End: 2018-01-05 | Stop reason: HOSPADM

## 2018-01-04 RX ORDER — HYDROXYZINE HYDROCHLORIDE 25 MG/1
25 TABLET, FILM COATED ORAL EVERY 6 HOURS PRN
Status: DISCONTINUED | OUTPATIENT
Start: 2018-01-04 | End: 2018-01-05 | Stop reason: HOSPADM

## 2018-01-04 RX ORDER — HYDROMORPHONE HYDROCHLORIDE 1 MG/ML
.3-.5 INJECTION, SOLUTION INTRAMUSCULAR; INTRAVENOUS; SUBCUTANEOUS
Status: DISCONTINUED | OUTPATIENT
Start: 2018-01-04 | End: 2018-01-05 | Stop reason: HOSPADM

## 2018-01-04 RX ORDER — GLYCOPYRROLATE 0.2 MG/ML
INJECTION, SOLUTION INTRAMUSCULAR; INTRAVENOUS PRN
Status: DISCONTINUED | OUTPATIENT
Start: 2018-01-04 | End: 2018-01-04

## 2018-01-04 RX ORDER — ASPIRIN 325 MG/1
325 TABLET, FILM COATED ORAL DAILY
Qty: 60 EACH | Refills: 0 | Status: SHIPPED | OUTPATIENT
Start: 2018-01-05 | End: 2021-04-08

## 2018-01-04 RX ADMIN — Medication 5 MG: at 07:57

## 2018-01-04 RX ADMIN — SODIUM CHLORIDE, POTASSIUM CHLORIDE, SODIUM LACTATE AND CALCIUM CHLORIDE: 600; 310; 30; 20 INJECTION, SOLUTION INTRAVENOUS at 08:36

## 2018-01-04 RX ADMIN — SODIUM CHLORIDE, POTASSIUM CHLORIDE, SODIUM LACTATE AND CALCIUM CHLORIDE: 600; 310; 30; 20 INJECTION, SOLUTION INTRAVENOUS at 07:34

## 2018-01-04 RX ADMIN — PROPOFOL 30 MG: 10 INJECTION, EMULSION INTRAVENOUS at 09:44

## 2018-01-04 RX ADMIN — HYDROMORPHONE HYDROCHLORIDE 0.5 MG: 1 INJECTION, SOLUTION INTRAMUSCULAR; INTRAVENOUS; SUBCUTANEOUS at 12:19

## 2018-01-04 RX ADMIN — OXYCODONE HYDROCHLORIDE 10 MG: 5 TABLET ORAL at 17:06

## 2018-01-04 RX ADMIN — SCOPALAMINE 1 PATCH: 1 PATCH, EXTENDED RELEASE TRANSDERMAL at 07:17

## 2018-01-04 RX ADMIN — GENTAMICIN SULFATE 3000 ML: 40 INJECTION, SOLUTION INTRAMUSCULAR; INTRAVENOUS at 08:26

## 2018-01-04 RX ADMIN — GLYCOPYRROLATE 0.2 MG: 0.2 INJECTION, SOLUTION INTRAMUSCULAR; INTRAVENOUS at 07:40

## 2018-01-04 RX ADMIN — OXYCODONE HYDROCHLORIDE 10 MG: 5 TABLET ORAL at 13:05

## 2018-01-04 RX ADMIN — FENTANYL CITRATE 50 MCG: 50 INJECTION, SOLUTION INTRAMUSCULAR; INTRAVENOUS at 09:44

## 2018-01-04 RX ADMIN — Medication 0.5 MG: at 11:10

## 2018-01-04 RX ADMIN — ONDANSETRON 4 MG: 2 INJECTION INTRAMUSCULAR; INTRAVENOUS at 10:09

## 2018-01-04 RX ADMIN — PHENYLEPHRINE HYDROCHLORIDE 50 MCG: 10 INJECTION INTRAVENOUS at 09:03

## 2018-01-04 RX ADMIN — MIDAZOLAM 1 MG: 1 INJECTION INTRAMUSCULAR; INTRAVENOUS at 08:39

## 2018-01-04 RX ADMIN — BUPIVACAINE HYDROCHLORIDE 20 ML: 2.5 INJECTION, SOLUTION EPIDURAL; INFILTRATION; INTRACAUDAL at 08:50

## 2018-01-04 RX ADMIN — MIDAZOLAM 1 MG: 1 INJECTION INTRAMUSCULAR; INTRAVENOUS at 07:36

## 2018-01-04 RX ADMIN — Medication 3 G: at 10:09

## 2018-01-04 RX ADMIN — Medication 0.5 MG: at 14:25

## 2018-01-04 RX ADMIN — Medication 5 MG: at 08:05

## 2018-01-04 RX ADMIN — DEXMEDETOMIDINE HYDROCHLORIDE 20 MCG: 100 INJECTION, SOLUTION INTRAVENOUS at 09:55

## 2018-01-04 RX ADMIN — Medication 5 MG: at 08:51

## 2018-01-04 RX ADMIN — KETOROLAC TROMETHAMINE 30 MG: 30 INJECTION, SOLUTION INTRAMUSCULAR at 19:52

## 2018-01-04 RX ADMIN — Medication 5 MG: at 08:28

## 2018-01-04 RX ADMIN — PHENYLEPHRINE HYDROCHLORIDE 100 MCG: 10 INJECTION INTRAVENOUS at 09:23

## 2018-01-04 RX ADMIN — FENTANYL CITRATE 50 MCG: 50 INJECTION, SOLUTION INTRAMUSCULAR; INTRAVENOUS at 07:36

## 2018-01-04 RX ADMIN — DEXAMETHASONE SODIUM PHOSPHATE 4 MG: 4 INJECTION, SOLUTION INTRA-ARTICULAR; INTRALESIONAL; INTRAMUSCULAR; INTRAVENOUS; SOFT TISSUE at 07:57

## 2018-01-04 RX ADMIN — KETOROLAC TROMETHAMINE 30 MG: 30 INJECTION, SOLUTION INTRAMUSCULAR at 10:09

## 2018-01-04 RX ADMIN — THROMBIN, TOPICAL (BOVINE) 5000 UNITS: KIT at 09:04

## 2018-01-04 RX ADMIN — Medication 3 G: at 07:41

## 2018-01-04 RX ADMIN — LIDOCAINE HYDROCHLORIDE 100 MG: 20 INJECTION, SOLUTION INFILTRATION; PERINEURAL at 07:41

## 2018-01-04 RX ADMIN — Medication 0.5 MG: at 18:26

## 2018-01-04 RX ADMIN — HYDROMORPHONE HYDROCHLORIDE 0.5 MG: 1 INJECTION, SOLUTION INTRAMUSCULAR; INTRAVENOUS; SUBCUTANEOUS at 10:36

## 2018-01-04 RX ADMIN — PHENYLEPHRINE HYDROCHLORIDE 100 MCG: 10 INJECTION INTRAVENOUS at 09:30

## 2018-01-04 RX ADMIN — CEFAZOLIN SODIUM 2 G: 2 INJECTION, SOLUTION INTRAVENOUS at 17:08

## 2018-01-04 RX ADMIN — Medication 5 MG: at 07:44

## 2018-01-04 RX ADMIN — PHENYLEPHRINE HYDROCHLORIDE 100 MCG: 10 INJECTION INTRAVENOUS at 09:15

## 2018-01-04 RX ADMIN — KETOROLAC TROMETHAMINE 30 MG: 30 INJECTION, SOLUTION INTRAMUSCULAR at 14:22

## 2018-01-04 RX ADMIN — OXYCODONE HYDROCHLORIDE 10 MG: 5 TABLET ORAL at 23:46

## 2018-01-04 RX ADMIN — BUPIVACAINE HYDROCHLORIDE IN DEXTROSE 13.5 MG: 7.5 INJECTION, SOLUTION SUBARACHNOID at 07:41

## 2018-01-04 RX ADMIN — PHENYLEPHRINE HYDROCHLORIDE 50 MCG: 10 INJECTION INTRAVENOUS at 08:54

## 2018-01-04 RX ADMIN — PROPOFOL 100 MCG/KG/MIN: 10 INJECTION, EMULSION INTRAVENOUS at 07:41

## 2018-01-04 ASSESSMENT — PAIN DESCRIPTION - DESCRIPTORS
DESCRIPTORS: BURNING
DESCRIPTORS: BURNING

## 2018-01-04 NOTE — PROGRESS NOTES
1210PACU. A/O. Drsg CDI to right foot. CMS adeq. Toes pink & warm. Right foot elevated on pillow.Cast slightly moist. Pt c/o pain to Right hip-  Level 4/10.     Pt's family at bedside. Detailed update given.

## 2018-01-04 NOTE — ANESTHESIA PREPROCEDURE EVALUATION
Anesthesia Evaluation     . Pt has had prior anesthetic.     History of anesthetic complications   - PONV        ROS/MED HX    ENT/Pulmonary:      (-) sleep apnea   Neurologic:       Cardiovascular: Comment: preHTN -  No meds    (+) hypertension----. : . . . :. .       METS/Exercise Tolerance:     Hematologic:         Musculoskeletal:   (+) arthritis, , , -       GI/Hepatic:     (+) GERD Asymptomatic on medication,       Renal/Genitourinary:         Endo:     (+) Obesity, .   (-) Type I DM   Psychiatric:         Infectious Disease:         Malignancy:         Other:                     Physical Exam  Normal systems: dental    Airway   Mallampati: II  TM distance: >3 FB  Neck ROM: full    Dental     Cardiovascular   Rhythm and rate: regular and normal      Pulmonary    breath sounds clear to auscultation                    Anesthesia Plan      History & Physical Review  History and physical reviewed and following examination; no interval change.    ASA Status:  3 .    NPO Status:  > 8 hours    Plan for Spinal with Intravenous induction. Maintenance will be Balanced.    PONV prophylaxis:  Ondansetron (or other 5HT-3) and Scopolamine patch       Postoperative Care      Consents  Anesthetic plan, risks, benefits and alternatives discussed with:  Patient..                          .

## 2018-01-04 NOTE — ANESTHESIA PROCEDURE NOTES
Peripheral nerve/Neuraxial procedure note : intrathecal  Pre-Procedure  Performed by NEREIDA CODY  Location: OR      Pre-Anesthestic Checklist: patient identified, IV checked, risks and benefits discussed, informed consent, monitors and equipment checked and pre-op evaluation    Timeout  Correct Patient: Yes   Correct Procedure: Yes   Correct Site: Yes   Correct Laterality: N/A   Correct Position: Yes   Site Marked: N/A   .   Procedure Documentation    .    Procedure:    Intrathecal.  Insertion Site:L3-4  (midline approach)      Patient Prep;povidone-iodine 7.5% surgical scrub.  .  Needle: Aj MosleytieFernando Spinal Needle (gauge): 24  Spinal/LP Needle Length (inches): 5 # of attempts: 1 and # of redirects:  .       Assessment/Narrative  Paresthesias: No.  .  .  clear CSF fluid removed . Comments:  Subarachnoid Block  1.8 ml 0.75% bupivacaine with dextrose and 100 mcg epinephrine

## 2018-01-04 NOTE — OP NOTE
DATE OF PROCEDURE:  01/04/2018       PREOPERATIVE DIAGNOSIS:  Rheumatoid arthritis, right subtalar joint.      POSTOPERATIVE DIAGNOSIS:  Rheumatoid arthritis, right subtalar joint.      PROCEDURE:   1.  Right iliac crest bone graft.   2.  Right subtalar arthrodesis.      DESCRIPTION OF PROCEDURE:  After adequate spinal anesthetic was obtained, Ariane Reilly's right iliac crest and right foot and ankle were prepped and draped in usual sterile fashion.  Thigh tourniquet was utilized.  We turned our attention first to obtainment of the iliac crest bone graft.  Curvilinear incision was made over the anterior iliac crest.  This was carried down through the subcutaneous tissue.  Fascia was incised in line with the skin incision, subperiosteally stripped off the dorsal aspect of the crest.  Utilizing the Micro-Aire microsagittal saw, a dorsal cortical window was made and elevated.  Cancellous bone graft was then removed.  Wound was thoroughly irrigated with antibiotic solution.  Thrombin-soaked Gelfoam was placed in the bicortical cavity.  Dorsal cortical window was closed and impacted.  Fascia was closed with interrupted #1 Vicryl, subcutaneous tissue closed in layers with interrupted #1, running 0 and 2-0 Vicryl, staples for the skin.  0.25% Marcaine was instilled along the incision line, a temporary sterile dressing applied.      We then turned our attention to the main portion of the procedure.  Limb was exsanguinated, tourniquet raised.  A dorsal longitudinal skin incision was made over the talonavicular joint at the tibialis anterior, carried down through the subcutaneous tissue, protecting the neurovascular bundle laterally.  Soft tissue was subperiosteally stripped off of the dorsal aspect of the talar neck.  We then turned our attention laterally, where a longitudinal skin incision was made from the distal aspect of the fibula to the anterior process of the calcaneus, carried down through the subcutaneous  tissue.  Sinus tarsi was entered.  Severe arthritic changes were noted.  Utilizing osteotomes, curet and rongeur, the middle and posterior facets were decorticated.  Wound was thoroughly irrigated with antibiotic solution.  Previously obtained iliac crest bone graft was then placed.  With the hindfoot held in correct, slightly valgus position, 2 diverging guide pins were placed from the talus into the calcaneus.  Appropriate screws were chosen, countersunk and placed.  There was excellent fixation and alignment.  This was confirmed with intraoperative radiograph.  Wounds were thoroughly irrigated with antibiotic solution and closed in layers utilizing running 0 and 2-0 Vicryl, 3-0 Prolene vertical mattress stitch for the skin.  Marcaine 0.25% was instilled along the incision line.  Sterile Marito Zapata dressing was applied.  Tourniquet was released after 72 minutes, and with good capillary refill, the patient was taken to the recovery room in good condition.         KRISHNA ARMSTRONG MD             D: 2018 10:22   T: 2018 15:55   MT: [id number]      Name:     CARLENE REYNOLDS   MRN:      [id number]        Account:        [id number]   :      1975           Procedure Date: 2018      Document: [id number]       cc: Krishna Armstrong MD

## 2018-01-04 NOTE — IP AVS SNAPSHOT
[facility] Observation Unit    [address] [place] [id number]    Phone:  [phone]                                       After Visit Summary   1/4/2018    Ariane Reilly    MRN: [id number]           After Visit Summary Signature Page     I have received my discharge instructions, and my questions have been answered. I have discussed any challenges I see with this plan with the nurse or doctor.    ..........................................................................................................................................  Patient/Patient Representative Signature      ..........................................................................................................................................  Patient Representative Print Name and Relationship to Patient    ..................................................               ................................................  Date                                            Time    ..........................................................................................................................................  Reviewed by Signature/Title    ...................................................              ..............................................  Date                                                            Time

## 2018-01-04 NOTE — PROGRESS NOTES
Admission medication history interview status for the 1/4/2018  admission is complete. See EPIC admission navigator for prior to admission medications     Medication history source reliability:Good    Medication history interview source(s):Patient    Medication history resources (including written lists, pill bottles, clinic record):None    Primary pharmacy.[facility]    Additional medication history information not noted on PTA med list :None    Time spent in this activity: 45 MINUTES    Prior to Admission medications    Medication Sig Last Dose Taking? Auth Provider   BIOTIN PO Take 5,000 mcg by mouth daily 12/28/2017 Yes Reported, Patient   IBUPROFEN PO Take 200-800 mg by mouth 3 times daily as needed for moderate pain 12/27/2017 at prn Yes Reported, Patient   NAPROXEN PO Take 500 mg by mouth 2 times daily as needed for moderate pain MORE THAN WEEK at PRN Yes Reported, Patient   VITAMIN D, CHOLECALCIFEROL, PO Take 2,000 Units by mouth daily 12/28/2017 Yes Reported, Patient   fluticasone-salmeterol (ADVAIR) 250-50 MCG/DOSE diskus inhaler Inhale 1 puff into the lungs 2 times daily as needed 12/14/2017 at PRN Yes Reported, Patient   levonorgestrel (MIRENA) 20 MCG/24HR IUD Use one device, intrauterine, for up to 5 years. IN PLACE Yes Andrew Crockett MD   Tocilizumab (ACTEMRA SC) Inject 162 mg Subcutaneous every 14 days One injection every two weeks  12/27/2017 Yes Reported, Patient   LEFLUNOMIDE PO Take 20 mg by mouth every other day  12/27/2017 Yes Reported, Patient

## 2018-01-04 NOTE — ANESTHESIA CARE TRANSFER NOTE
Patient: Ariane Reilly    Procedure(s):  RIGHT SUBTALAR FUSION WITH RIGHT ILIAC CREST BONE GRAFTING  - Wound Class: I-Clean      Diagnosis: DJD RIGHT SUBTALAR JOINT   Diagnosis Additional Information: No value filed.    Anesthesia Type:   Spinal     Note:  Airway :Room Air  Patient transferred to:PACU  Comments: Level of Conscious:Awake  Vital Signs   BP:113/62   HR:57   RR:12   O2 Saturation:93   Oxygen LPM:Room Air   Temp:36.3  Dentition:Unchanged from preop  Patient Status:Stable  Report to PACU RN.Handoff Report: Identifed the Patient, Identified the Reponsible Provider, Reviewed the pertinent medical history, Discussed the surgical course, Reviewed Intra-OP anesthesia mangement and issues during anesthesia, Set expectations for post-procedure period and Allowed opportunity for questions and acknowledgement of understanding      Vitals: (Last set prior to Anesthesia Care Transfer)    CRNA VITALS  1/4/2018 1008 - 1/4/2018 1046      1/4/2018             Pulse: 67    SpO2: 92 %                Electronically Signed By: Christine Marie Volp Hodgkins, CRNA, APRN CRNA  January 4, 2018  10:46 AM

## 2018-01-04 NOTE — PROVIDER NOTIFICATION
Dr. Segal notified of poor pain control to right foot, pain decreasing after toradol and dilaudid given @1424. VSS, Right foot elevated and blue foam pillow, CMS intact, on RA.

## 2018-01-04 NOTE — DISCHARGE INSTRUCTIONS
"Post-Operative Instructions Hindfoot/Ankle/Tendon Surgery Patients  Krishna Segal M.D.    Board Certified  Fellowship, Foot and Ankle Surgery  Member, American Academy of Orthopaedic Surgeons  American Orthopaedic Foot and Ankle Society    Direct Phone 9:00am to 5:00pm [phone]  After Hours/24 Hour On Call [phone]    Diet:  ? Take all prescribed medications with food   ? Narcotic pain medication can cause constipation  ? Avoid alcoholic beverages while taking pain medications   ? Increase dietary fiber protein rich foods, fluids post operatively    Activity:  ? Elevate operative foot 90% of the time.  ? \"Swelling runs downhill\" - the more you elevate, the less permanent swelling you will experience  ? Strict non-weight bearing on operative leg.  Crutches, walker, scooter or wheelchair must be used for mobilization.  ? You may be up to the bathroom, to the kitchen for meals and to change locations in the house.  ? You may do sit-ups (NOT BONE GRAFT PATIENTS), leg raises, upper body exercises  ? Adjust your immobilized foot/ankle to relieve pressure on your heel    Special Care Instructions:     ? DO NOT CHANGE OR ALTER THE CAST  ? Keep your cast/splint dry  ? Sponge bath or wrap seal your cast for shower  ? If you have an ILIAC CREST BONE GRAFT, No shower until staples removed   ? Change hip dressing daily:    ? Clean incision with hydrogen peroxide   ? Cover with dry sterile gauze and minimal tape  ? It is not unusual to have some \"numbness\" in foot and ankle following surgery    Report to your Doctor if any of the following occur:  ? Elevated temperature, 101o or higher  ? Increased pain unrelieved by pain medication and/or elevation  ? Tight/loose/wet cast  ? Increased swelling in foot or hip  ? Calf pain  ? Hip graft site drainage  ? For any shortness of breath, DIAL 911, go to the Emergency Room  Medications:  ? Take all of the following medications with food.    ? Aspirin/Ecotrin 325 mg.:  1 " tab 1x/day  ? This is for blood clot prevention  ? If you have a sensitive stomach, Ecotrin can be less irritating  ? If you experience any unusual bruising or bleeding or ringing in the ears, stop this medication and call us  ? Oxycodone  1-2 every 3-4 hours as needed for pain  o You may take 1 tablet with plain Tylenol or Ibuprofen for less severe pain or to decrease nausea/mental effect  ?  Hydrocodone  1-2 every 3-4 hours as needed for pain  o You may take 1 tablet with plain Tylenol or Ibuprofen for less severe pain or to decrease nausea/mental effects  ? Ibuprofen 1 every 6 hours as needed for inflammatory pain        Your Next Appointment:    ? Appt. scheduled for _____Wed 1/17/18____1:15 pm______________________        Direct Phone 9:00am to 5:00pm [phone]    After Hours/24 Hour On Call [phone]          Information for Patients Discharging with a Transderm Scopolamine Patch       Dry mouth is a common side effect.    Drowsiness is another common side effect especially when combined with pain medication.  Please avoid activities that require mental alertness such as driving a car or making important legal decisions.    Since Scopolamine can cause temporary dilation of the pupils and blurred vision if it comes in contact with the eyes; be sure to wash your hands thoroughly with soap and water immediately after handling the patch.   When you remove your patch, please stick it to a tissue or paper towel for disposal.      Remove the patch immediately and contact a physician in the unlikely event that you experience symptoms of acute glaucoma (pain and reddening of the eyes, accompanied by dilated pupils).    Remove the patch if you develop any difficulties urinating.  If you cannot urinate after removing your patch, please notify your surgeon.    Remove the patch 24 hours after surgery.

## 2018-01-04 NOTE — IP AVS SNAPSHOT
MRN:[id number]                      After Visit Summary   1/4/2018    Ariane Reilly    MRN: [id number]           Thank you!     Thank you for choosing [facility] for your care. Our goal is always to provide you with excellent care. Hearing back from our patients is one way we can continue to improve our services. Please take a few minutes to complete the written survey that you may receive in the mail after you visit with us. Thank you!        Patient Information     Date Of Birth          1975        Designated Caregiver       Most Recent Value    Caregiver    Will someone help with your care after discharge? yes    Name of designated caregiver - CHARLES- [phone]    Phone number of caregiver [phone]    Caregiver address [address] [place] [address]      About your hospital stay     You were admitted on:  January 4, 2018 You last received care in the:  [facility]    You were discharged on:  January 5, 2018       Who to Call     For medical emergencies, please call 911.  For non-urgent questions about your medical care, please call your primary care provider or clinic, [phone]  For questions related to your surgery, please call your surgery clinic        Attending Provider     Provider Specialty    Krishna Segal MD Orthopedics       Primary Care Provider Office Phone # Fax #    Tasha Tran [phone]      Future tests that were ordered for you     WOUND CARE SUPPLIES       CHANGE ILIAC CREST SITE DAILY, CLEAN WITH H202                  Further instructions from your care team       Post-Operative Instructions Hindfoot/Ankle/Tendon Surgery Patients  Krishna Segal M.D.    Board Certified  Fellowship, Foot and Ankle Surgery  Member, American Academy of Orthopaedic Surgeons  American Orthopaedic Foot and Ankle Society    Direct Phone 9:00am to 5:00pm [phone]  After Hours/24 Hour On Call [phone]  [phone]    Diet:  ? Take all prescribed medications with food   ? Narcotic pain medication can cause constipation  ? Avoid alcoholic beverages while taking pain medications   ? Increase dietary fiber protein rich foods, fluids post operatively    Activity:  ? Elevate operative foot 90% of the time.  ? \"Swelling runs downhill\" - the more you elevate, the less permanent swelling you will experience  ? Strict non-weight bearing on operative leg.  Crutches, walker, scooter or wheelchair must be used for mobilization.  ? You may be up to the bathroom, to the kitchen for meals and to change locations in the house.  ? You may do sit-ups (NOT BONE GRAFT PATIENTS), leg raises, upper body exercises  ? Adjust your immobilized foot/ankle to relieve pressure on your heel    Special Care Instructions:     ? DO NOT CHANGE OR ALTER THE CAST  ? Keep your cast/splint dry  ? Sponge bath or wrap seal your cast for shower  ? If you have an ILIAC CREST BONE GRAFT, No shower until staples removed   ? Change hip dressing daily:    ? Clean incision with hydrogen peroxide   ? Cover with dry sterile gauze and minimal tape  ? It is not unusual to have some \"numbness\" in foot and ankle following surgery    Report to your Doctor if any of the following occur:  ? Elevated temperature, 101o or higher  ? Increased pain unrelieved by pain medication and/or elevation  ? Tight/loose/wet cast  ? Increased swelling in foot or hip  ? Calf pain  ? Hip graft site drainage  ? For any shortness of breath, DIAL 911, go to the Emergency Room  Medications:  ? Take all of the following medications with food.    ? Aspirin/Ecotrin 325 mg.:  1 tab 1x/day  ? This is for blood clot prevention  ? If you have a sensitive stomach, Ecotrin can be less irritating  ? If you experience any unusual bruising or bleeding or ringing in the ears, stop this medication and call us  ? Oxycodone  1-2 every 3-4 hours as needed for pain  o You may take 1 tablet with plain Tylenol or " "Ibuprofen for less severe pain or to decrease nausea/mental effect  ?  Hydrocodone  1-2 every 3-4 hours as needed for pain  o You may take 1 tablet with plain Tylenol or Ibuprofen for less severe pain or to decrease nausea/mental effects  ? Ibuprofen 1 every 6 hours as needed for inflammatory pain        Your Next Appointment:    ? Appt. scheduled for _____Wed 1/17/18____1:15 pm______________________        Direct Phone 9:00am to 5:00pm [phone]    After Hours/24 Hour On Call [phone]          Information for Patients Discharging with a Transderm Scopolamine Patch       Dry mouth is a common side effect.    Drowsiness is another common side effect especially when combined with pain medication.  Please avoid activities that require mental alertness such as driving a car or making important legal decisions.    Since Scopolamine can cause temporary dilation of the pupils and blurred vision if it comes in contact with the eyes; be sure to wash your hands thoroughly with soap and water immediately after handling the patch.   When you remove your patch, please stick it to a tissue or paper towel for disposal.      Remove the patch immediately and contact a physician in the unlikely event that you experience symptoms of acute glaucoma (pain and reddening of the eyes, accompanied by dilated pupils).    Remove the patch if you develop any difficulties urinating.  If you cannot urinate after removing your patch, please notify your surgeon.    Remove the patch 24 hours after surgery.      Pending Results     No orders found for last 3 day(s).            Admission Information     Date & Time Provider Department Dept. Phone    1/4/2018 Krishna Segal MD [facility] [phone]      Your Vitals Were     Blood Pressure Temperature Respirations Height Weight Last Period    120/58 (BP Location: Right arm) 97.2  F (36.2  C) (Oral) 16 1.778 m (5' 10\") 123.6 kg (272 lb 6.4 oz) 12/25/2017    Pulse " Oximetry BMI (Body Mass Index)                94% 39.09 kg/m2          MyChart Information     [facility] gives you secure access to your electronic health record. If you see a primary care provider, you can also send messages to your care team and make appointments. If you have questions, please call your primary care clinic.  If you do not have a primary care provider, please call [phone] and they will assist you.        Care EveryWhere ID     This is your Care EveryWhere ID. This could be used by other organizations to access your [facility] medical records  [id number]        Equal Access to Services     [facility]: Hadsarah Shepard, wamika heller, alycia sandersalvidal oseguera, justin krause . So [facility] [phone].    ATENCIÓN: Si habla español, tiene a levine disposición servicios gratuitos de asistencia lingüística. Lucas al [phone].    We comply with applicable federal civil rights laws and [place] laws. We do not discriminate on the basis of race, color, national origin, age, disability, sex, sexual orientation, or gender identity.               Review of your medicines      START taking        Dose / Directions    aspirin - buffered 325 MG Tabs tablet   Commonly known as:  ASCRIPTIN        Dose:  325 mg   Take 1 tablet (325 mg) by mouth daily   Quantity:  60 each   Refills:  0       hydrOXYzine 25 MG tablet   Commonly known as:  ATARAX        Dose:  25 mg   Take 1 tablet (25 mg) by mouth every 6 hours as needed for other (adjuvant pain)   Quantity:  50 tablet   Refills:  0       ondansetron 4 MG ODT tab   Commonly known as:  ZOFRAN-ODT        Dose:  4 mg   Take 1 tablet (4 mg) by mouth every 6 hours as needed for nausea or vomiting   Quantity:  30 tablet   Refills:  0       oxyCODONE IR 5 MG tablet   Commonly known as:  ROXICODONE        Dose:  5-10 mg   Take 1-2 tablets (5-10 mg) by mouth every 3 hours as needed for other (pain control or improvement in  physical function. Hold dose for analgesic side effects.)   Quantity:  60 tablet   Refills:  0         CONTINUE these medicines which may have CHANGED, or have new prescriptions. If we are uncertain of the size of tablets/capsules you have at home, strength may be listed as something that might have changed.        Dose / Directions    * IBUPROFEN PO   This may have changed:  Another medication with the same name was added. Make sure you understand how and when to take each.        Dose:  200-800 mg   Take 200-800 mg by mouth 3 times daily as needed for moderate pain   Refills:  0       * ibuprofen 600 MG tablet   Commonly known as:  ADVIL/MOTRIN   This may have changed:  You were already taking a medication with the same name, and this prescription was added. Make sure you understand how and when to take each.        Dose:  600 mg   Take 1 tablet (600 mg) by mouth every 6 hours as needed for other (inflammatory pain)   Quantity:  60 tablet   Refills:  0       * Notice:  This list has 2 medication(s) that are the same as other medications prescribed for you. Read the directions carefully, and ask your doctor or other care provider to review them with you.      CONTINUE these medicines which have NOT CHANGED        Dose / Directions    BIOTIN PO        Dose:  5000 mcg   Take 5,000 mcg by mouth daily   Refills:  0       fluticasone-salmeterol 250-50 MCG/DOSE diskus inhaler   Commonly known as:  ADVAIR        Dose:  1 puff   Inhale 1 puff into the lungs 2 times daily as needed   Refills:  0       levonorgestrel 20 MCG/24HR IUD   Commonly known as:  MIRENA   Used for:  Encounter for IUD insertion        Use one device, intrauterine, for up to 5 years.   Quantity:  1 each   Refills:  0       VITAMIN D (CHOLECALCIFEROL) PO        Dose:  2000 Units   Take 2,000 Units by mouth daily   Refills:  0         STOP taking     ACTEMRA SC           LEFLUNOMIDE PO           NAPROXEN PO                Where to get your medicines       These medications were sent to [facility] [place] - [address]  [address] [place] [address]     Phone:  [phone]     aspirin - buffered 325 MG Tabs tablet    hydrOXYzine 25 MG tablet    ibuprofen 600 MG tablet    ondansetron 4 MG ODT tab         Some of these will need a paper prescription and others can be bought over the counter. Ask your nurse if you have questions.     Bring a paper prescription for each of these medications     oxyCODONE IR 5 MG tablet                Protect others around you: Learn how to safely use, store and throw away your medicines at www.disposemymeds.org.             Medication List: This is a list of all your medications and when to take them. Check marks below indicate your daily home schedule. Keep this list as a reference.      Medications           Morning Afternoon Evening Bedtime As Needed    aspirin - buffered 325 MG Tabs tablet   Commonly known as:  ASCRIPTIN   Take 1 tablet (325 mg) by mouth daily   Last time this was given:  325 mg on 1/5/2018  9:55 AM                                BIOTIN PO   Take 5,000 mcg by mouth daily                                fluticasone-salmeterol 250-50 MCG/DOSE diskus inhaler   Commonly known as:  ADVAIR   Inhale 1 puff into the lungs 2 times daily as needed                                hydrOXYzine 25 MG tablet   Commonly known as:  ATARAX   Take 1 tablet (25 mg) by mouth every 6 hours as needed for other (adjuvant pain)   Last time this was given:  25 mg on 1/5/2018  9:56 AM                                * IBUPROFEN PO   Take 200-800 mg by mouth 3 times daily as needed for moderate pain                                * ibuprofen 600 MG tablet   Commonly known as:  ADVIL/MOTRIN   Take 1 tablet (600 mg) by mouth every 6 hours as needed for other (inflammatory pain)                                levonorgestrel 20 MCG/24HR IUD   Commonly known as:  MIRENA   Use one device, intrauterine, for up to  5 years.                                ondansetron 4 MG ODT tab   Commonly known as:  ZOFRAN-ODT   Take 1 tablet (4 mg) by mouth every 6 hours as needed for nausea or vomiting                                oxyCODONE IR 5 MG tablet   Commonly known as:  ROXICODONE   Take 1-2 tablets (5-10 mg) by mouth every 3 hours as needed for other (pain control or improvement in physical function. Hold dose for analgesic side effects.)   Last time this was given:  5 mg on 1/5/2018  9:55 AM                                VITAMIN D (CHOLECALCIFEROL) PO   Take 2,000 Units by mouth daily                                * Notice:  This list has 2 medication(s) that are the same as other medications prescribed for you. Read the directions carefully, and ask your doctor or other care provider to review them with you.

## 2018-01-04 NOTE — ANESTHESIA POSTPROCEDURE EVALUATION
Patient: Ariane Reilly    Procedure(s):  RIGHT SUBTALAR FUSION WITH RIGHT ILIAC CREST BONE GRAFTING  - Wound Class: I-Clean      Diagnosis:DJD RIGHT SUBTALAR JOINT   Diagnosis Additional Information: No value filed.    Anesthesia Type:  Spinal    Note:  Anesthesia Post Evaluation    Patient location during evaluation: PACU  Patient participation: Able to fully participate in evaluation  Level of consciousness: awake and alert  Pain management: adequate  Airway patency: patent  Cardiovascular status: acceptable  Respiratory status: acceptable  Hydration status: acceptable  PONV: none     Anesthetic complications: None          Last vitals:  Vitals:    01/04/18 1425 01/04/18 1519 01/04/18 1608   BP: 121/54 121/65    Resp: 16 16 16   Temp:  36.7  C (98.1  F)    SpO2: 94% 93%          Electronically Signed By: Marito Edgar MD  January 4, 2018  4:26 PM

## 2018-01-05 VITALS
TEMPERATURE: 97.2 F | RESPIRATION RATE: 16 BRPM | OXYGEN SATURATION: 94 % | DIASTOLIC BLOOD PRESSURE: 58 MMHG | SYSTOLIC BLOOD PRESSURE: 120 MMHG | BODY MASS INDEX: 39 KG/M2 | HEIGHT: 70 IN | WEIGHT: 272.4 LBS

## 2018-01-05 LAB — GLUCOSE BLDC GLUCOMTR-MCNC: 128 MG/DL (ref 70–99)

## 2018-01-05 PROCEDURE — 40000893 ZZH STATISTIC PT IP EVAL DEFER

## 2018-01-05 PROCEDURE — 25000128 H RX IP 250 OP 636: Performed by: ORTHOPAEDIC SURGERY

## 2018-01-05 PROCEDURE — 40000934 ZZH STATISTIC OUTPATIENT (NON-OBS) DAY

## 2018-01-05 PROCEDURE — 25000132 ZZH RX MED GY IP 250 OP 250 PS 637: Performed by: ORTHOPAEDIC SURGERY

## 2018-01-05 PROCEDURE — 82962 GLUCOSE BLOOD TEST: CPT

## 2018-01-05 RX ADMIN — OXYCODONE HYDROCHLORIDE 10 MG: 5 TABLET ORAL at 02:59

## 2018-01-05 RX ADMIN — ASPIRIN 325 MG: 325 TABLET, FILM COATED ORAL at 09:55

## 2018-01-05 RX ADMIN — KETOROLAC TROMETHAMINE 30 MG: 30 INJECTION, SOLUTION INTRAMUSCULAR at 01:55

## 2018-01-05 RX ADMIN — HYDROXYZINE HYDROCHLORIDE 25 MG: 25 TABLET ORAL at 09:56

## 2018-01-05 RX ADMIN — CEFAZOLIN SODIUM 2 G: 2 INJECTION, SOLUTION INTRAVENOUS at 01:55

## 2018-01-05 RX ADMIN — OXYCODONE HYDROCHLORIDE 5 MG: 5 TABLET ORAL at 09:55

## 2018-01-05 RX ADMIN — CEFAZOLIN SODIUM 2 G: 2 SOLUTION INTRAVENOUS at 11:09

## 2018-01-05 RX ADMIN — KETOROLAC TROMETHAMINE 30 MG: 30 INJECTION, SOLUTION INTRAMUSCULAR at 09:54

## 2018-01-05 RX ADMIN — OXYCODONE HYDROCHLORIDE 10 MG: 5 TABLET ORAL at 06:37

## 2018-01-05 NOTE — PLAN OF CARE
Problem: Patient Care Overview  Goal: Plan of Care/Patient Progress Review  Outcome: Adequate for Discharge Date Met: 01/05/18  Patient discharged to home by wheelchair at 1200 -  01/05/18.  Medication regimen and new medications discussed with patient and patient verbalizes understanding.  Diet and activity and wound care discussed with patient. Dressing changed on iliac crest and new dressings sent home with patient.  Upcoming appointments reviewed.  No questions at this time.

## 2018-01-05 NOTE — PLAN OF CARE
Problem: Patient Care Overview  Goal: Plan of Care/Patient Progress Review  PT: Orders received, chart reviewed. Pt admitted under outpatient status is POD 1 R subtalar arthrodesis, NWB. Pt has had multiple surgeries and is well versed on crutches use, denies a need for PT while inpatient. Pt in agreement, nursing states she has been up with SBA and crutches.

## 2018-01-05 NOTE — PLAN OF CARE
Problem: Patient Care Overview  Goal: Plan of Care/Patient Progress Review  Outcome: Improving  VSS, PRNs given x3 for pain, ambulates to bathroom with 1 assist, voiding well, A + O x 4, eating well, bowel sounds. Wounds CDI.

## 2018-01-05 NOTE — PROGRESS NOTES
Pod 1    Date of Service: January 5, 2018    Afeb, n/v intact  Reviewed findings at surg  Plan: Physical therapy, home, instructed

## 2018-01-05 NOTE — PLAN OF CARE
Problem: Patient Care Overview  Goal: Plan of Care/Patient Progress Review  A&Ox4. VSS. RA. Up SBA w/ crutches. NWB on right foot. Right leg elevated on foam block overnight. CMS intact. Dressing CDI with ace bandage wrap. Pain controlled with PRN Oxycodone. Capnography in use, IPI 10. Tolerated regular diet. No nausea or vomiting noted. IV SL.

## 2018-02-14 ENCOUNTER — TRANSFERRED RECORDS (OUTPATIENT)
Dept: HEALTH INFORMATION MANAGEMENT | Facility: CLINIC | Age: 43
End: 2018-02-14

## 2018-09-11 ENCOUNTER — OFFICE VISIT (OUTPATIENT)
Dept: URGENT CARE | Facility: URGENT CARE | Age: 43
End: 2018-09-11
Payer: COMMERCIAL

## 2018-09-11 VITALS
HEART RATE: 69 BPM | TEMPERATURE: 98.5 F | WEIGHT: 253 LBS | SYSTOLIC BLOOD PRESSURE: 150 MMHG | BODY MASS INDEX: 36.3 KG/M2 | DIASTOLIC BLOOD PRESSURE: 84 MMHG

## 2018-09-11 DIAGNOSIS — J34.0 CELLULITIS OF NOSE: Primary | ICD-10-CM

## 2018-09-11 PROBLEM — E66.01 MORBID OBESITY (H): Status: ACTIVE | Noted: 2018-09-11

## 2018-09-11 PROCEDURE — 99213 OFFICE O/P EST LOW 20 MIN: CPT | Performed by: PHYSICIAN ASSISTANT

## 2018-09-11 RX ORDER — CEPHALEXIN 500 MG/1
500 CAPSULE ORAL 4 TIMES DAILY
Qty: 40 CAPSULE | Refills: 0 | Status: SHIPPED | OUTPATIENT
Start: 2018-09-11 | End: 2018-09-20

## 2018-09-11 RX ORDER — MUPIROCIN 20 MG/G
OINTMENT TOPICAL 3 TIMES DAILY
Qty: 22 G | Refills: 0 | Status: SHIPPED | OUTPATIENT
Start: 2018-09-11 | End: 2018-09-18

## 2018-09-11 RX ORDER — LORATADINE 10 MG/1
10 TABLET ORAL DAILY
COMMUNITY
End: 2022-05-17

## 2018-09-11 NOTE — MR AVS SNAPSHOT
After Visit Summary   9/11/2018    Ariane Reilly    MRN: [id number]           Patient Information     Date Of Birth          1975        Visit Information        Provider Department      9/11/2018 6:35 PM Gina Aguilar PA-C [facility]        Today's Diagnoses     Cellulitis of nose    -  1       Follow-ups after your visit        Your next 10 appointments already scheduled     Sep 20, 2018  8:00 AM CDT   PHYSICAL with Tasha Tran MD   [facility] ([address] [address] [phone]              Who to contact     If you have questions or need follow up information about today's clinic visit or your schedule please contact [facility] directly at [phone].  Normal or non-critical lab and imaging results will be communicated to you by MyChart, letter or phone within 4 business days after the clinic has received the results. If you do not hear from us within 7 days, please contact the clinic through MyChart or phone. If you have a critical or abnormal lab result, we will notify you by phone as soon as possible.  Submit refill requests through [employer] or call your pharmacy and they will forward the refill request to us. Please allow 3 business days for your refill to be completed.          Additional Information About Your Visit        MyChart Information     [employer] gives you secure access to your electronic health record. If you see a primary care provider, you can also send messages to your care team and make appointments. If you have questions, please call your primary care clinic.  If you do not have a primary care provider, please call [phone] and they will assist you.        Care EveryWhere ID     This is your Care EveryWhere ID. This could be used by other organizations to access your [facility] medical records  [id number]        Your Vitals Were     Pulse Temperature BMI  (Body Mass Index)             69 98.5  F (36.9  C) (Oral) 36.3 kg/m2          Blood Pressure from Last 3 Encounters:   09/11/18 150/84   01/05/18 120/58   12/14/17 136/84    Weight from Last 3 Encounters:   09/11/18 253 lb (114.8 kg)   01/04/18 272 lb 6.4 oz (123.6 kg)   12/14/17 274 lb (124.3 kg)              Today, you had the following     No orders found for display         Today's Medication Changes          These changes are accurate as of 9/11/18  7:40 PM.  If you have any questions, ask your nurse or doctor.               Start taking these medicines.        Dose/Directions    cephALEXin 500 MG capsule   Commonly known as:  KEFLEX   Used for:  Cellulitis of nose        Dose:  500 mg   Take 1 capsule (500 mg) by mouth 4 times daily for 10 days   Quantity:  40 capsule   Refills:  0       mupirocin 2 % ointment   Commonly known as:  BACTROBAN   Used for:  Cellulitis of nose        Apply topically 3 times daily for 7 days   Quantity:  22 g   Refills:  0            Where to get your medicines      These medications were sent to [place] [address] [facility] - [address] [address]     Phone:  [phone]     cephALEXin 500 MG capsule    mupirocin 2 % ointment                Primary Care Provider Office Phone # Fax #    Tasha Tran [phone]       [address] [facility] [address]        Equal Access to Services     [facility]: Hadii aad ku hadasho Soomaali, waaxda luqadaha, qaybta kaalmada adeegyada, waxay idiin hayjosien ademonet krause . So [facility] [phone].    ATENCIÓN: Si habla español, tiene a levine disposición servicios gratuitos de asistencia lingüística. Llame al [phone].    We comply with applicable federal civil rights laws and [place] laws. We do not discriminate on the basis of race, color, national origin, age, disability, sex, sexual orientation, or gender identity.            Thank you!     Thank you for choosing  [facility]  [facility]. Our goal is always to provide you with excellent care. Hearing back from our patients is one way we can continue to improve our services. Please take a few minutes to complete the written survey that you may receive in the mail after your visit with us. Thank you!             Your Updated Medication List - Protect others around you: Learn how to safely use, store and throw away your medicines at www.disposemymeds.org.          This list is accurate as of 9/11/18  7:40 PM.  Always use your most recent med list.                   Brand Name Dispense Instructions for use Diagnosis    ACTEMRA SC      Inject Subcutaneous once a week        aspirin - buffered 325 MG Tabs tablet    ASCRIPTIN    60 each    Take 1 tablet (325 mg) by mouth daily    Rheumatoid arthritis with positive rheumatoid factor, involving unspecified site (H)       BIOTIN PO      Take 5,000 mcg by mouth daily        cephALEXin 500 MG capsule    KEFLEX    40 capsule    Take 1 capsule (500 mg) by mouth 4 times daily for 10 days    Cellulitis of nose       fluticasone-salmeterol 250-50 MCG/DOSE diskus inhaler    ADVAIR     Inhale 1 puff into the lungs 2 times daily as needed        hydrOXYzine 25 MG tablet    ATARAX    50 tablet    Take 1 tablet (25 mg) by mouth every 6 hours as needed for other (adjuvant pain)    Rheumatoid arthritis with positive rheumatoid factor, involving unspecified site (H)       * IBUPROFEN PO      Take 200-800 mg by mouth 3 times daily as needed for moderate pain        * ibuprofen 600 MG tablet    ADVIL/MOTRIN    60 tablet    Take 1 tablet (600 mg) by mouth every 6 hours as needed for other (inflammatory pain)    Rheumatoid arthritis with positive rheumatoid factor, involving unspecified site (H)       leflunomide 8 mg/mL suspension    ARAVA     Take 1 tablet by mouth every other day        levonorgestrel 20 MCG/24HR IUD    MIRENA    1 each    Use one device, intrauterine, for up to 5  years.    Encounter for IUD insertion       loratadine 10 MG tablet    CLARITIN     Take 10 mg by mouth daily        mupirocin 2 % ointment    BACTROBAN    22 g    Apply topically 3 times daily for 7 days    Cellulitis of nose       ondansetron 4 MG ODT tab    ZOFRAN-ODT    30 tablet    Take 1 tablet (4 mg) by mouth every 6 hours as needed for nausea or vomiting    Rheumatoid arthritis with positive rheumatoid factor, involving unspecified site (H)       oxyCODONE IR 5 MG tablet    ROXICODONE    60 tablet    Take 1-2 tablets (5-10 mg) by mouth every 3 hours as needed for other (pain control or improvement in physical function. Hold dose for analgesic side effects.)    Rheumatoid arthritis with positive rheumatoid factor, involving unspecified site (H)       PROBIOTIC ADVANCED PO           VITAMIN D (CHOLECALCIFEROL) PO      Take 2,000 Units by mouth daily        * Notice:  This list has 2 medication(s) that are the same as other medications prescribed for you. Read the directions carefully, and ask your doctor or other care provider to review them with you.

## 2018-09-12 NOTE — PROGRESS NOTES
S: 42-year-old female presents for evaluation of redness and pain over the bridge of the nose for a couple of days.  Had exact same symptoms in October 2014 and was diagnosed with cellulitis and treated with Keflex.  The pain and inflammation improved within 2 days of antibiotic.  No fever.  She has noted odor coming out of her nose.  No nasal lesions.  No injury to her nose.  She is a dental hygienist and wears a mask and has not been able to pinch the rash as it is so sore.      Allergies   Allergen Reactions     No Known Allergies        Past Medical History:   Diagnosis Date     Arthritis     rheumatoid arthritis     Family history of diabetes mellitus type II      Gastro-oesophageal reflux disease      PONV (postoperative nausea and vomiting)          Current Outpatient Prescriptions on File Prior to Visit:  BIOTIN PO Take 5,000 mcg by mouth daily   IBUPROFEN PO Take 200-800 mg by mouth 3 times daily as needed for moderate pain   levonorgestrel (MIRENA) 20 MCG/24HR IUD Use one device, intrauterine, for up to 5 years.   VITAMIN D, CHOLECALCIFEROL, PO Take 2,000 Units by mouth daily   aspirin - buffered (ASCRIPTIN) 325 MG TABS tablet Take 1 tablet (325 mg) by mouth daily (Patient not taking: Reported on 9/11/2018)   fluticasone-salmeterol (ADVAIR) 250-50 MCG/DOSE diskus inhaler Inhale 1 puff into the lungs 2 times daily as needed   hydrOXYzine (ATARAX) 25 MG tablet Take 1 tablet (25 mg) by mouth every 6 hours as needed for other (adjuvant pain) (Patient not taking: Reported on 9/11/2018)   ibuprofen (ADVIL/MOTRIN) 600 MG tablet Take 1 tablet (600 mg) by mouth every 6 hours as needed for other (inflammatory pain)   ondansetron (ZOFRAN-ODT) 4 MG ODT tab Take 1 tablet (4 mg) by mouth every 6 hours as needed for nausea or vomiting (Patient not taking: Reported on 9/11/2018)   oxyCODONE IR (ROXICODONE) 5 MG tablet Take 1-2 tablets (5-10 mg) by mouth every 3 hours as needed for other (pain control or improvement in  physical function. Hold dose for analgesic side effects.) (Patient not taking: Reported on 9/11/2018)     No current facility-administered medications on file prior to visit.     Social History   Substance Use Topics     Smoking status: Never Smoker     Smokeless tobacco: Never Used     Alcohol use Yes      Comment: sporadically       ROS:  CONSTITUTIONAL: Negative for fatigue or fever.  EYES: Negative for eye problems.  ENT: As above.  RESP: As above.  MUSCULOSKELETAL:  Negative for significant muscle or joint pains.  NEUROLOGIC: Negative for headaches.  SKIN: as above    OBJECTIVE:  /84  Pulse 69  Temp 98.5  F (36.9  C) (Oral)  Wt 253 lb (114.8 kg)  BMI 36.3 kg/m2  GENERAL APPEARANCE: Healthy, alert and no distress.  EYES:Conjunctiva/sclera clear.  EARS: No cerumen.   Ear canals w/o erythema.  TM's intact w/o erythema.    NOSE/MOUTH: Nose without ulcers, erythema or lesions.  SINUSES: No maxillary sinus tenderness.  THROAT: No erythema w/o tonsillar enlargement . No exudates.  NECK: Supple, nontender, no lymphadenopathy.  NEURO: Awake, alert    SKIN: Right greater than left bridge of nose is extremely tender with redness.  Almost feels like there is a subtle lump on the right bridge.  No palpable fluctuance.        ASSESSMENT:     ICD-10-CM    1. Cellulitis of nose J34.0 cephALEXin (KEFLEX) 500 MG capsule     mupirocin (BACTROBAN) 2 % ointment         PLAN: Oral antibiotic.  Bactroban ointment.  Follow-up primary 3-5 days if no improvement.  Sooner as needed for increased pain, fever or spreading of redness.  Lots of rest and fluids.  Gina Aguilar PA-C

## 2018-09-19 NOTE — PATIENT INSTRUCTIONS
Preventive Health Recommendations  Female Ages 40 to 49    Yearly exam:     See your health care provider every year in order to  1. Review health changes.   2. Discuss preventive care.    3. Review your medicines if your doctor prescribed any.      Get a Pap test every three years (unless you have an abnormal result and your provider advises testing more often).      If you get Pap tests with HPV test, you only need to test every 5 years, unless you have an abnormal result. You do not need a Pap test if your uterus was removed (hysterectomy) and you have not had cancer.      You should be tested each year for STDs (sexually transmitted diseases), if you're at risk.     Ask your doctor if you should have a mammogram.      Have a colonoscopy (test for colon cancer) if someone in your family has had colon cancer or polyps before age 50.       Have a cholesterol test every 5 years.       Have a diabetes test (fasting glucose) after age 45. If you are at risk for diabetes, you should have this test every 3 years.    Shots: Get a flu shot each year. Get a tetanus shot every 10 years.     Nutrition:     Eat at least 5 servings of fruits and vegetables each day.    Eat whole-grain bread, whole-wheat pasta and brown rice instead of white grains and rice.    Get adequate Calcium and Vitamin D.      Lifestyle    Exercise at least 150 minutes a week (an average of 30 minutes a day, 5 days a week). This will help you control your weight and prevent disease.    Limit alcohol to one drink per day.    No smoking.     Wear sunscreen to prevent skin cancer.  See your dentist every six months for an exam and cleaning.  Eating Heart-Healthy Foods  Eating has a big impact on your heart health. In fact, eating healthier can improve several of your heart risks at once. For instance, it helps you manage weight, cholesterol, and blood pressure. Here are ideas to help you make heart-healthy changes without giving up all the foods and  flavors you love.  Getting started  Talk with your healthcare provider about eating plans, such as the DASH or Mediterranean diet. You may also be referred to a dietitian.  Change a few things at a time. Give yourself time to get used to a few eating changes before adding more.  Work to create a tasty, healthy eating plan that you can stick to for the rest of your life.    Goals for healthy eating  Below are some tips to improve your eating habits:  Limit saturated fats and trans fats. Saturated fats raise your levels of cholesterol, so keep these fats to a minimum. They are found in foods such as fatty meats, whole milk, cheese, and palm and coconut oils. Avoid trans fats because they lower good cholesterol as well as raise bad cholesterol. Trans fats are most often found in processed foods.  Reduce sodium (salt) intake. Eating too much salt may increase your blood pressure. Limit your sodium intake to 2,300 milligrams (mg) per day (the amount in 1 teaspoon of salt), or less if your healthcare provider recommends it. Dining out less often and eating fewer processed foods are two great ways to decrease the amount of salt you consume.  Managing calories. A calorie is a unit of energy. Your body burns calories for fuel, but if you eat more calories than your body burns, the extras are stored as fat. Your healthcare provider can help you create a diet plan to manage your calories. This will likely include eating healthier foods as well as exercising regularly. To help you track your progress, keep a diary to record what you eat and how often you exercise.  Choose the right foods  Aim to make these foods staples of your diet. If you have diabetes, you may have different recommendations than what is listed here:  Fruits and vegetables provide plenty of nutrients without a lot of calories. At meals, fill half your plate with these foods. Split the other half of your plate between whole grains and lean protein.  Whole  grains are high in fiber and rich in vitamins and nutrients. Good choices include whole-wheat bread, pasta, and brown rice.  Lean proteins give you nutrition with less fat. Good choices include fish, skinless chicken, and beans.  Low-fat or nonfat dairy provides nutrients without a lot of fat. Try low-fat or nonfat milk, cheese, or yogurt.  Healthy fats can be good for you in small amounts. These are unsaturated fats, such as olive oil, nuts, and fish. Try to have at least 2 servings per week of fatty fish, such as salmon, sardines, mackerel, rainbow trout, and albacore tuna. These contain omega-3 fatty acids, which are good for your heart. Flaxseed is another source of a heart-healthy fat.  More on heart-healthy eating  Read food labels  Healthy eating starts at the grocery store. Be sure to pay attention to food labels on packaged foods. Look for products that are high in fiber and protein, and low in saturated fat, cholesterol, and sodium. Avoid products that contain trans fat. And pay close attention to serving size. For instance, if you plan to eat two servings, double all the numbers on the label.  Prepare food right  A key part of healthy cooking is cutting down on added fat and salt. Look on the internet for lower-fat, lower-sodium recipes. Also, try these tips:  Remove fat from meat and skin from poultry before cooking.  Skim fat from the surface of soups and sauces.  Broil, boil, bake, steam, grill, and microwave food without added fats.  Choose ingredients that spice up your food without adding calories, fat, or sodium. Try these items: horseradish, hot sauce, lemon, mustard, nonfat salad dressings, and vinegar. For salt-free herbs and spices, try basil, cilantro, cinnamon, pepper, and rosemary.  Date Last Reviewed: 10/1/2017    8286-2916 [employer]. [address] [place] [address], [place] [address]. All rights reserved. This information is not intended as a substitute for professional medical  care. Always follow your healthcare professional's instructions.        Low-Salt Diet  This diet removes foods that are high in salt. It also limits the amount of salt you use when cooking. It is most often used for people with high blood pressure, edema (fluid retention), and kidney, liver, or heart disease.  Table salt contains the mineral sodium. Your body needs sodium to work normally. But too much sodium can make your health problems worse. Your healthcare provider is recommending a low-salt (also called low-sodium) diet for you. Your total daily allowance of salt is 1,500 to 2,300 milligrams (mg). It is less than 1 teaspoon of table salt. This means you can have only about 500 to 700 mg of sodium at each meal. People with certain health problems should limit salt intake to the lower end of the recommended range.    When you cook, don t add much salt. If you can cook without using salt, even better. Don t add salt to your food at the table.  When shopping, read food labels. Salt is often called sodium on the label. Choose foods that are salt-free, low salt, or very low salt. Note that foods with reduced salt may not lower your salt intake enough.    Beans, potatoes, and pasta  Ok: Dry beans, split peas, lentils, potatoes, rice, macaroni, pasta, spaghetti without added salt  Avoid: Potato chips, tortilla chips, and similar products  Breads and cereals  Ok: Low-sodium breads, rolls, cereals, and cakes; low-salt crackers, matzo crackers  Avoid: Salted crackers, pretzels, popcorn, Maori toast, pancakes, muffins  Dairy  Ok: Milk, chocolate milk, hot chocolate mix, low-salt cheeses, and yogurt  Avoid: Processed cheese and cheese spreads; Roquefort, Camembert, and cottage cheese; buttermilk, instant breakfast drink  Desserts  Ok: Ice cream, frozen yogurt, juice bars, gelatin, cookies and pies, sugar, honey, jelly, hard candy  Avoid: Most pies, cakes and cookies prepared or processed with salt; instant  pudding  Drinks  Ok: Tea, coffee, fizzy (carbonated) drinks, juices  Avoid: Flavored coffees, electrolyte replacement drinks, sports drinks  Meats  Ok: All fresh meat, fish, poultry, low-salt tuna, eggs, egg substitute  Avoid: Smoked, pickled, brine-cured, or salted meats and fish. This includes stoddard, chipped beef, corned beef, hot dogs, deli meats, ham, kosher meats, salt pork, sausage, canned tuna, salted codfish, smoked salmon, herring, sardines, or anchovies.  Seasonings and spices  Ok: Most seasonings are okay. Good substitutes for salt include: fresh herb blends, hot sauce, lemon, garlic, baca, vinegar, dry mustard, parsley, cilantro, horseradish, tomato paste, regular margarine, mayonnaise, unsalted butter, cream cheese, vegetable oil, cream, low-salt salad dressing and gravy.  Avoid: Regular ketchup, relishes, pickles, soy sauce, teriyaki sauce, Worcestershire sauce, BBQ sauce, tartar sauce, meat tenderizer, chili sauce, regular gravy, regular salad dressing, salted butter  Soups  Ok: Low-salt soups and broths made with allowed foods  Avoid: Bouillon cubes, soups with smoked or salted meats, regular soup and broth  Vegetables  Ok: Most vegetables are okay; also low-salt tomato and vegetable juices  Avoid: Sauerkraut and other brine-soaked vegetables; pickles and other pickled vegetables; tomato juice, olives  Date Last Reviewed: 8/1/2016 2000-2017 [employer]. [address] [place] [address], [place] [address]. All rights reserved. This information is not intended as a substitute for professional medical care. Always follow your healthcare professional's instructions.        Lisinopril Oral tablet  What is this medicine?  LISINOPRIL (lyse IN oh pril) is an ACE inhibitor. This medicine is used to treat high blood pressure and heart failure. It is also used to protect the heart immediately after a heart attack.  This medicine may be used for other purposes; ask your health care provider or pharmacist  if you have questions.  What should I tell my health care provider before I take this medicine?  They need to know if you have any of these conditions:  diabetes  heart or blood vessel disease  immune system disease like lupus or scleroderma  kidney disease  low blood pressure  previous swelling of the tongue, face, or lips with difficulty breathing, difficulty swallowing, hoarseness, or tightening of the throat  an unusual or allergic reaction to lisinopril, other ACE inhibitors, insect venom, foods, dyes, or preservatives  pregnant or trying to get pregnant  breast-feeding  How should I use this medicine?  Take this medicine by mouth with a glass of water. Follow the directions on your prescription label. You may take this medicine with or without food. Take your medicine at regular intervals. Do not stop taking this medicine except on the advice of your doctor or health care professional.  Talk to your pediatrician regarding the use of this medicine in children. Special care may be needed. While this drug may be prescribed for children as young as 6 years of age for selected conditions, precautions do apply.  Overdosage: If you think you have taken too much of this medicine contact a poison control center or emergency room at once.  NOTE: This medicine is only for you. Do not share this medicine with others.  What if I miss a dose?  If you miss a dose, take it as soon as you can. If it is almost time for your next dose, take only that dose. Do not take double or extra doses.  What may interact with this medicine?  diuretics  lithium  NSAIDs, medicines for pain and inflammation, like ibuprofen or naproxen  over-the-counter herbal supplements like hawthorn  potassium salts or potassium supplements  salt substitutes  This list may not describe all possible interactions. Give your health care provider a list of all the medicines, herbs, non-prescription drugs, or dietary supplements you use. Also tell them if you  smoke, drink alcohol, or use illegal drugs. Some items may interact with your medicine.  What should I watch for while using this medicine?  Visit your doctor or health care professional for regular check ups. Check your blood pressure as directed. Ask your doctor what your blood pressure should be, and when you should contact him or her. Call your doctor or health care professional if you notice an irregular or fast heart beat.  Women should inform their doctor if they wish to become pregnant or think they might be pregnant. There is a potential for serious side effects to an unborn child. Talk to your health care professional or pharmacist for more information.  Check with your doctor or health care professional if you get an attack of severe diarrhea, nausea and vomiting, or if you sweat a lot. The loss of too much body fluid can make it dangerous for you to take this medicine.  You may get drowsy or dizzy. Do not drive, use machinery, or do anything that needs mental alertness until you know how this drug affects you. Do not stand or sit up quickly, especially if you are an older patient. This reduces the risk of dizzy or fainting spells. Alcohol can make you more drowsy and dizzy. Avoid alcoholic drinks.  Avoid salt substitutes unless you are told otherwise by your doctor or health care professional.  Do not treat yourself for coughs, colds, or pain while you are taking this medicine without asking your doctor or health care professional for advice. Some ingredients may increase your blood pressure.  What side effects may I notice from receiving this medicine?  Side effects that you should report to your doctor or health care professional as soon as possible:  abdominal pain with or without nausea or vomiting  allergic reactions like skin rash or hives, swelling of the hands, feet, face, lips, throat, or tongue  dark urine  difficulty breathing  dizzy, lightheaded or fainting spell  fever or sore  throat  irregular heart beat, chest pain  pain or difficulty passing urine  redness, blistering, peeling or loosening of the skin, including inside the mouth  unusually weak  yellowing of the eyes or skin  Side effects that usually do not require medical attention (report to your doctor or health care professional if they continue or are bothersome):  change in taste  cough  decreased sexual function or desire  headache  sun sensitivity  tiredness  This list may not describe all possible side effects. Call your doctor for medical advice about side effects. You may report side effects to FDA at 7-661-PIF-0955.  Where should I keep my medicine?  Keep out of the reach of children.  Store at room temperature between 15 and 30 degrees C (59 and 86 degrees F). Protect from moisture. Keep container tightly closed. Throw away any unused medicine after the expiration date.  NOTE:This sheet is a summary. It may not cover all possible information. If you have questions about this medicine, talk to your doctor, pharmacist, or health care provider. Copyright  2016 [employer]
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
## 2018-09-20 ENCOUNTER — OFFICE VISIT (OUTPATIENT)
Dept: FAMILY MEDICINE | Facility: CLINIC | Age: 43
End: 2018-09-20
Payer: COMMERCIAL

## 2018-09-20 VITALS
DIASTOLIC BLOOD PRESSURE: 98 MMHG | HEIGHT: 69 IN | RESPIRATION RATE: 16 BRPM | TEMPERATURE: 97.1 F | SYSTOLIC BLOOD PRESSURE: 160 MMHG | BODY MASS INDEX: 37.15 KG/M2 | WEIGHT: 250.85 LBS | HEART RATE: 68 BPM | OXYGEN SATURATION: 99 %

## 2018-09-20 DIAGNOSIS — Z00.01 ENCOUNTER FOR ROUTINE ADULT PHYSICAL EXAM WITH ABNORMAL FINDINGS: Primary | ICD-10-CM

## 2018-09-20 DIAGNOSIS — E66.01 MORBID OBESITY DUE TO EXCESS CALORIES (H): ICD-10-CM

## 2018-09-20 DIAGNOSIS — M25.511 ACUTE PAIN OF RIGHT SHOULDER: ICD-10-CM

## 2018-09-20 DIAGNOSIS — I10 BENIGN ESSENTIAL HYPERTENSION: ICD-10-CM

## 2018-09-20 DIAGNOSIS — M05.9 RHEUMATOID ARTHRITIS WITH POSITIVE RHEUMATOID FACTOR, INVOLVING UNSPECIFIED SITE (H): ICD-10-CM

## 2018-09-20 DIAGNOSIS — Z23 NEED FOR PROPHYLACTIC VACCINATION AND INOCULATION AGAINST INFLUENZA: ICD-10-CM

## 2018-09-20 LAB
ALBUMIN UR-MCNC: NEGATIVE MG/DL
ANION GAP SERPL CALCULATED.3IONS-SCNC: 6 MMOL/L (ref 3–14)
APPEARANCE UR: CLEAR
BILIRUB UR QL STRIP: NEGATIVE
BUN SERPL-MCNC: 19 MG/DL (ref 7–30)
CALCIUM SERPL-MCNC: 8.9 MG/DL (ref 8.5–10.1)
CHLORIDE SERPL-SCNC: 108 MMOL/L (ref 94–109)
CHOLEST SERPL-MCNC: 206 MG/DL
CO2 SERPL-SCNC: 28 MMOL/L (ref 20–32)
COLOR UR AUTO: YELLOW
CREAT SERPL-MCNC: 0.64 MG/DL (ref 0.52–1.04)
GFR SERPL CREATININE-BSD FRML MDRD: >90 ML/MIN/1.7M2
GLUCOSE SERPL-MCNC: 109 MG/DL (ref 70–99)
GLUCOSE UR STRIP-MCNC: NEGATIVE MG/DL
HDLC SERPL-MCNC: 54 MG/DL
HGB UR QL STRIP: ABNORMAL
KETONES UR STRIP-MCNC: NEGATIVE MG/DL
LDLC SERPL CALC-MCNC: 141 MG/DL
LEUKOCYTE ESTERASE UR QL STRIP: NEGATIVE
NITRATE UR QL: NEGATIVE
NON-SQ EPI CELLS #/AREA URNS LPF: ABNORMAL /LPF
NONHDLC SERPL-MCNC: 152 MG/DL
PH UR STRIP: 6 PH (ref 5–7)
POTASSIUM SERPL-SCNC: 4.5 MMOL/L (ref 3.4–5.3)
RBC #/AREA URNS AUTO: ABNORMAL /HPF
SODIUM SERPL-SCNC: 142 MMOL/L (ref 133–144)
SOURCE: ABNORMAL
SP GR UR STRIP: 1.02 (ref 1–1.03)
TRIGL SERPL-MCNC: 56 MG/DL
TSH SERPL DL<=0.005 MIU/L-ACNC: 2.28 MU/L (ref 0.4–4)
UROBILINOGEN UR STRIP-ACNC: 0.2 EU/DL (ref 0.2–1)
WBC #/AREA URNS AUTO: ABNORMAL /HPF

## 2018-09-20 PROCEDURE — 90471 IMMUNIZATION ADMIN: CPT | Performed by: FAMILY MEDICINE

## 2018-09-20 PROCEDURE — 80048 BASIC METABOLIC PNL TOTAL CA: CPT | Performed by: FAMILY MEDICINE

## 2018-09-20 PROCEDURE — 84443 ASSAY THYROID STIM HORMONE: CPT | Performed by: FAMILY MEDICINE

## 2018-09-20 PROCEDURE — 99213 OFFICE O/P EST LOW 20 MIN: CPT | Mod: 25 | Performed by: FAMILY MEDICINE

## 2018-09-20 PROCEDURE — 90686 IIV4 VACC NO PRSV 0.5 ML IM: CPT | Performed by: FAMILY MEDICINE

## 2018-09-20 PROCEDURE — 81001 URINALYSIS AUTO W/SCOPE: CPT | Performed by: FAMILY MEDICINE

## 2018-09-20 PROCEDURE — 80061 LIPID PANEL: CPT | Performed by: FAMILY MEDICINE

## 2018-09-20 PROCEDURE — 36415 COLL VENOUS BLD VENIPUNCTURE: CPT | Performed by: FAMILY MEDICINE

## 2018-09-20 PROCEDURE — 99396 PREV VISIT EST AGE 40-64: CPT | Mod: 25 | Performed by: FAMILY MEDICINE

## 2018-09-20 RX ORDER — LISINOPRIL 10 MG/1
10 TABLET ORAL DAILY
Qty: 30 TABLET | Refills: 1 | Status: SHIPPED | OUTPATIENT
Start: 2018-09-20 | End: 2021-04-08

## 2018-09-20 NOTE — PROGRESS NOTES

## 2018-09-20 NOTE — MR AVS SNAPSHOT
After Visit Summary   9/20/2018    Ariane Reilly    MRN: [id number]           Patient Information     Date Of Birth          1975        Visit Information        Provider Department      9/20/2018 8:00 AM Tasha Tran MD [facility]        Today's Diagnoses     Encounter for routine adult physical exam with abnormal findings    -  1    Benign essential hypertension        Acute pain of right shoulder        Rheumatoid arthritis with positive rheumatoid factor, involving unspecified site (H)          Care Instructions      Preventive Health Recommendations  Female Ages 40 to 49    Yearly exam:     See your health care provider every year in order to  1. Review health changes.   2. Discuss preventive care.    3. Review your medicines if your doctor prescribed any.      Get a Pap test every three years (unless you have an abnormal result and your provider advises testing more often).      If you get Pap tests with HPV test, you only need to test every 5 years, unless you have an abnormal result. You do not need a Pap test if your uterus was removed (hysterectomy) and you have not had cancer.      You should be tested each year for STDs (sexually transmitted diseases), if you're at risk.     Ask your doctor if you should have a mammogram.      Have a colonoscopy (test for colon cancer) if someone in your family has had colon cancer or polyps before age 50.       Have a cholesterol test every 5 years.       Have a diabetes test (fasting glucose) after age 45. If you are at risk for diabetes, you should have this test every 3 years.    Shots: Get a flu shot each year. Get a tetanus shot every 10 years.     Nutrition:     Eat at least 5 servings of fruits and vegetables each day.    Eat whole-grain bread, whole-wheat pasta and brown rice instead of white grains and rice.    Get adequate Calcium and Vitamin D.      Lifestyle    Exercise at least 150 minutes a week (an average of 30  minutes a day, 5 days a week). This will help you control your weight and prevent disease.    Limit alcohol to one drink per day.    No smoking.     Wear sunscreen to prevent skin cancer.  See your dentist every six months for an exam and cleaning.  Eating Heart-Healthy Foods  Eating has a big impact on your heart health. In fact, eating healthier can improve several of your heart risks at once. For instance, it helps you manage weight, cholesterol, and blood pressure. Here are ideas to help you make heart-healthy changes without giving up all the foods and flavors you love.  Getting started  Talk with your healthcare provider about eating plans, such as the DASH or Mediterranean diet. You may also be referred to a dietitian.  Change a few things at a time. Give yourself time to get used to a few eating changes before adding more.  Work to create a tasty, healthy eating plan that you can stick to for the rest of your life.    Goals for healthy eating  Below are some tips to improve your eating habits:  Limit saturated fats and trans fats. Saturated fats raise your levels of cholesterol, so keep these fats to a minimum. They are found in foods such as fatty meats, whole milk, cheese, and palm and coconut oils. Avoid trans fats because they lower good cholesterol as well as raise bad cholesterol. Trans fats are most often found in processed foods.  Reduce sodium (salt) intake. Eating too much salt may increase your blood pressure. Limit your sodium intake to 2,300 milligrams (mg) per day (the amount in 1 teaspoon of salt), or less if your healthcare provider recommends it. Dining out less often and eating fewer processed foods are two great ways to decrease the amount of salt you consume.  Managing calories. A calorie is a unit of energy. Your body burns calories for fuel, but if you eat more calories than your body burns, the extras are stored as fat. Your healthcare provider can help you create a diet plan to manage  your calories. This will likely include eating healthier foods as well as exercising regularly. To help you track your progress, keep a diary to record what you eat and how often you exercise.  Choose the right foods  Aim to make these foods staples of your diet. If you have diabetes, you may have different recommendations than what is listed here:  Fruits and vegetables provide plenty of nutrients without a lot of calories. At meals, fill half your plate with these foods. Split the other half of your plate between whole grains and lean protein.  Whole grains are high in fiber and rich in vitamins and nutrients. Good choices include whole-wheat bread, pasta, and brown rice.  Lean proteins give you nutrition with less fat. Good choices include fish, skinless chicken, and beans.  Low-fat or nonfat dairy provides nutrients without a lot of fat. Try low-fat or nonfat milk, cheese, or yogurt.  Healthy fats can be good for you in small amounts. These are unsaturated fats, such as olive oil, nuts, and fish. Try to have at least 2 servings per week of fatty fish, such as salmon, sardines, mackerel, rainbow trout, and albacore tuna. These contain omega-3 fatty acids, which are good for your heart. Flaxseed is another source of a heart-healthy fat.  More on heart-healthy eating  Read food labels  Healthy eating starts at the grocery store. Be sure to pay attention to food labels on packaged foods. Look for products that are high in fiber and protein, and low in saturated fat, cholesterol, and sodium. Avoid products that contain trans fat. And pay close attention to serving size. For instance, if you plan to eat two servings, double all the numbers on the label.  Prepare food right  A key part of healthy cooking is cutting down on added fat and salt. Look on the internet for lower-fat, lower-sodium recipes. Also, try these tips:  Remove fat from meat and skin from poultry before cooking.  Skim fat from the surface of soups and  sauces.  Broil, boil, bake, steam, grill, and microwave food without added fats.  Choose ingredients that spice up your food without adding calories, fat, or sodium. Try these items: horseradish, hot sauce, lemon, mustard, nonfat salad dressings, and vinegar. For salt-free herbs and spices, try basil, cilantro, cinnamon, pepper, and rosemary.  Date Last Reviewed: 10/1/2017    4671-5111 [employer]. [address] [place] [address]. All rights reserved. This information is not intended as a substitute for professional medical care. Always follow your healthcare professional's instructions.        Low-Salt Diet  This diet removes foods that are high in salt. It also limits the amount of salt you use when cooking. It is most often used for people with high blood pressure, edema (fluid retention), and kidney, liver, or heart disease.  Table salt contains the mineral sodium. Your body needs sodium to work normally. But too much sodium can make your health problems worse. Your healthcare provider is recommending a low-salt (also called low-sodium) diet for you. Your total daily allowance of salt is 1,500 to 2,300 milligrams (mg). It is less than 1 teaspoon of table salt. This means you can have only about 500 to 700 mg of sodium at each meal. People with certain health problems should limit salt intake to the lower end of the recommended range.    When you cook, don t add much salt. If you can cook without using salt, even better. Don t add salt to your food at the table.  When shopping, read food labels. Salt is often called sodium on the label. Choose foods that are salt-free, low salt, or very low salt. Note that foods with reduced salt may not lower your salt intake enough.    Beans, potatoes, and pasta  Ok: Dry beans, split peas, lentils, potatoes, rice, macaroni, pasta, spaghetti without added salt  Avoid: Potato chips, tortilla chips, and similar products  Breads and cereals  Ok: Low-sodium  breads, rolls, cereals, and cakes; low-salt crackers, matzo crackers  Avoid: Salted crackers, pretzels, popcorn, Macedonian toast, pancakes, muffins  Dairy  Ok: Milk, chocolate milk, hot chocolate mix, low-salt cheeses, and yogurt  Avoid: Processed cheese and cheese spreads; Roquefort, Camembert, and cottage cheese; buttermilk, instant breakfast drink  Desserts  Ok: Ice cream, frozen yogurt, juice bars, gelatin, cookies and pies, sugar, honey, jelly, hard candy  Avoid: Most pies, cakes and cookies prepared or processed with salt; instant pudding  Drinks  Ok: Tea, coffee, fizzy (carbonated) drinks, juices  Avoid: Flavored coffees, electrolyte replacement drinks, sports drinks  Meats  Ok: All fresh meat, fish, poultry, low-salt tuna, eggs, egg substitute  Avoid: Smoked, pickled, brine-cured, or salted meats and fish. This includes stoddard, chipped beef, corned beef, hot dogs, deli meats, ham, kosher meats, salt pork, sausage, canned tuna, salted codfish, smoked salmon, herring, sardines, or anchovies.  Seasonings and spices  Ok: Most seasonings are okay. Good substitutes for salt include: fresh herb blends, hot sauce, lemon, garlic, baca, vinegar, dry mustard, parsley, cilantro, horseradish, tomato paste, regular margarine, mayonnaise, unsalted butter, cream cheese, vegetable oil, cream, low-salt salad dressing and gravy.  Avoid: Regular ketchup, relishes, pickles, soy sauce, teriyaki sauce, Worcestershire sauce, BBQ sauce, tartar sauce, meat tenderizer, chili sauce, regular gravy, regular salad dressing, salted butter  Soups  Ok: Low-salt soups and broths made with allowed foods  Avoid: Bouillon cubes, soups with smoked or salted meats, regular soup and broth  Vegetables  Ok: Most vegetables are okay; also low-salt tomato and vegetable juices  Avoid: Sauerkraut and other brine-soaked vegetables; pickles and other pickled vegetables; tomato juice, olives  Date Last Reviewed: 8/1/2016 2000-2017 [employer]  [address] [place] [address]. All rights reserved. This information is not intended as a substitute for professional medical care. Always follow your healthcare professional's instructions.        Lisinopril Oral tablet  What is this medicine?  LISINOPRIL (lyse IN oh pril) is an ACE inhibitor. This medicine is used to treat high blood pressure and heart failure. It is also used to protect the heart immediately after a heart attack.  This medicine may be used for other purposes; ask your health care provider or pharmacist if you have questions.  What should I tell my health care provider before I take this medicine?  They need to know if you have any of these conditions:  diabetes  heart or blood vessel disease  immune system disease like lupus or scleroderma  kidney disease  low blood pressure  previous swelling of the tongue, face, or lips with difficulty breathing, difficulty swallowing, hoarseness, or tightening of the throat  an unusual or allergic reaction to lisinopril, other ACE inhibitors, insect venom, foods, dyes, or preservatives  pregnant or trying to get pregnant  breast-feeding  How should I use this medicine?  Take this medicine by mouth with a glass of water. Follow the directions on your prescription label. You may take this medicine with or without food. Take your medicine at regular intervals. Do not stop taking this medicine except on the advice of your doctor or health care professional.  Talk to your pediatrician regarding the use of this medicine in children. Special care may be needed. While this drug may be prescribed for children as young as 6 years of age for selected conditions, precautions do apply.  Overdosage: If you think you have taken too much of this medicine contact a poison control center or emergency room at once.  NOTE: This medicine is only for you. Do not share this medicine with others.  What if I miss a dose?  If you miss a dose, take it as soon as you can. If  it is almost time for your next dose, take only that dose. Do not take double or extra doses.  What may interact with this medicine?  diuretics  lithium  NSAIDs, medicines for pain and inflammation, like ibuprofen or naproxen  over-the-counter herbal supplements like hawthorn  potassium salts or potassium supplements  salt substitutes  This list may not describe all possible interactions. Give your health care provider a list of all the medicines, herbs, non-prescription drugs, or dietary supplements you use. Also tell them if you smoke, drink alcohol, or use illegal drugs. Some items may interact with your medicine.  What should I watch for while using this medicine?  Visit your doctor or health care professional for regular check ups. Check your blood pressure as directed. Ask your doctor what your blood pressure should be, and when you should contact him or her. Call your doctor or health care professional if you notice an irregular or fast heart beat.  Women should inform their doctor if they wish to become pregnant or think they might be pregnant. There is a potential for serious side effects to an unborn child. Talk to your health care professional or pharmacist for more information.  Check with your doctor or health care professional if you get an attack of severe diarrhea, nausea and vomiting, or if you sweat a lot. The loss of too much body fluid can make it dangerous for you to take this medicine.  You may get drowsy or dizzy. Do not drive, use machinery, or do anything that needs mental alertness until you know how this drug affects you. Do not stand or sit up quickly, especially if you are an older patient. This reduces the risk of dizzy or fainting spells. Alcohol can make you more drowsy and dizzy. Avoid alcoholic drinks.  Avoid salt substitutes unless you are told otherwise by your doctor or health care professional.  Do not treat yourself for coughs, colds, or pain while you are taking this medicine  without asking your doctor or health care professional for advice. Some ingredients may increase your blood pressure.  What side effects may I notice from receiving this medicine?  Side effects that you should report to your doctor or health care professional as soon as possible:  abdominal pain with or without nausea or vomiting  allergic reactions like skin rash or hives, swelling of the hands, feet, face, lips, throat, or tongue  dark urine  difficulty breathing  dizzy, lightheaded or fainting spell  fever or sore throat  irregular heart beat, chest pain  pain or difficulty passing urine  redness, blistering, peeling or loosening of the skin, including inside the mouth  unusually weak  yellowing of the eyes or skin  Side effects that usually do not require medical attention (report to your doctor or health care professional if they continue or are bothersome):  change in taste  cough  decreased sexual function or desire  headache  sun sensitivity  tiredness  This list may not describe all possible side effects. Call your doctor for medical advice about side effects. You may report side effects to FDA at 9-570-FDA-0250.  Where should I keep my medicine?  Keep out of the reach of children.  Store at room temperature between 15 and 30 degrees C (59 and 86 degrees F). Protect from moisture. Keep container tightly closed. Throw away any unused medicine after the expiration date.  NOTE:This sheet is a summary. It may not cover all possible information. If you have questions about this medicine, talk to your doctor, pharmacist, or health care provider. Copyright  2016 [employer]                  Follow-ups after your visit        Additional Services     [facility] PT, HAND, AND CHIROPRACTIC REFERRAL       Physical Therapy, Hand Therapy and Chiropractic Care are available through:  *[facility]  *Hand Therapy (Occupational Therapy or Physical Therapy)  *[facility]    Call one  number to schedule at any of the above locations: [phone].    Physical therapy, Hand therapy and/or Chiropractic care has been recommended by your physician as an excellent treatment option to reduce pain and help people return to normal activities, including sports.  Therapy and/or chiropractic care services are a great complement or alternative to expensive and invasive surgery, injections, or long-term use of prescription medications. The primary goal is to identify the underlying problem and provide you the tools to manage your condition on your own.     Please be aware that coverage of these services is subject to the terms and limitations of your health insurance plan.  Call member services at your health plan with any benefit or coverage questions.      Please bring the following to your appointment:  *Your personal calendar for scheduling future appointments  *Comfortable clothing                  Follow-up notes from your care team     Return in about 3 weeks (around 10/11/2018) for BP Recheck, Med check.      Future tests that were ordered for you today     Open Future Orders        Priority Expected Expires Ordered    LINUS PT, HAND, AND CHIROPRACTIC REFERRAL Routine  9/20/2019 9/20/2018            Who to contact     If you have questions or need follow up information about today's clinic visit or your schedule please contact [facility] directly at [phone].  Normal or non-critical lab and imaging results will be communicated to you by MyChart, letter or phone within 4 business days after the clinic has received the results. If you do not hear from us within 7 days, please contact the clinic through [employer] or phone. If you have a critical or abnormal lab result, we will notify you by phone as soon as possible.  Submit refill requests through [employer] or call your pharmacy and they will forward the refill request to us. Please allow 3 business days for your refill to be completed.     "      Additional Information About Your Visit        [email or web address] Information     [facility] gives you secure access to your electronic health record. If you see a primary care provider, you can also send messages to your care team and make appointments. If you have questions, please call your primary care clinic.  If you do not have a primary care provider, please call [phone] and they will assist you.        Care EveryWhere ID     This is your Care EveryWhere ID. This could be used by other organizations to access your [facility] medical records  [id number]        Your Vitals Were     Pulse Temperature Respirations Height Last Period Pulse Oximetry    68 97.1  F (36.2  C) (Oral) 16 5' 9.25\" (1.759 m) 09/14/2018 (Exact Date) 99%    Breastfeeding? BMI (Body Mass Index)                No 36.78 kg/m2           Blood Pressure from Last 3 Encounters:   09/20/18 (!) 160/98   09/11/18 150/84   01/05/18 120/58    Weight from Last 3 Encounters:   09/20/18 250 lb 13.6 oz (113.8 kg)   09/11/18 253 lb (114.8 kg)   01/04/18 272 lb 6.4 oz (123.6 kg)              We Performed the Following     *UA reflex to Microscopic     Basic metabolic panel     Lipid Profile     TSH with free T4 reflex          Today's Medication Changes          These changes are accurate as of 9/20/18  8:46 AM.  If you have any questions, ask your nurse or doctor.               Start taking these medicines.        Dose/Directions    lisinopril 10 MG tablet   Commonly known as:  PRINIVIL/ZESTRIL   Used for:  Benign essential hypertension   Started by:  Tasha Tran MD        Dose:  10 mg   Take 1 tablet (10 mg) by mouth daily   Quantity:  30 tablet   Refills:  1         Stop taking these medicines if you haven't already. Please contact your care team if you have questions.     cephALEXin 500 MG capsule   Commonly known as:  KEFLEX   Stopped by:  Tasha Tran MD                Where to get your medicines      These medications were sent to [place] [address] [facility] " - [place], [place] - [address] [place] [address]  [address] [place] [address], [place] [address]     Phone:  [phone]     lisinopril 10 MG tablet                Primary Care Provider Office Phone # Fax #    Tasha Tran [phone] [address] [facility]  [place] [address]        Equal Access to Services     [facility]: Hadii aad ku hadasho Soomaali, waaxda luqadaha, qaybta kaalmada adeegyada, waxay idiin hayaan adeeg khchrissiesh la'aan ah. So [facility] [phone].    ATENCIÓN: Si habla español, tiene a levine disposición servicios gratuitos de asistencia lingüística. [facility] [phone].    We comply with applicable federal civil rights laws and [place] laws. We do not discriminate on the basis of race, color, national origin, age, disability, sex, sexual orientation, or gender identity.            Thank you!     Thank you for choosing [facility]  for your care. Our goal is always to provide you with excellent care. Hearing back from our patients is one way we can continue to improve our services. Please take a few minutes to complete the written survey that you may receive in the mail after your visit with us. Thank you!             Your Updated Medication List - Protect others around you: Learn how to safely use, store and throw away your medicines at www.disposemymeds.org.          This list is accurate as of 9/20/18  8:46 AM.  Always use your most recent med list.                   Brand Name Dispense Instructions for use Diagnosis    ACTEMRA SC      Inject Subcutaneous once a week        aspirin - buffered 325 MG Tabs tablet    ASCRIPTIN    60 each    Take 1 tablet (325 mg) by mouth daily    Rheumatoid arthritis with positive rheumatoid factor, involving unspecified site (H)       BIOTIN PO      Take 5,000 mcg by mouth daily        fluticasone-salmeterol 250-50 MCG/DOSE diskus inhaler    ADVAIR     Inhale 1 puff into the lungs 2 times daily as needed        ibuprofen 600 MG tablet     ADVIL/MOTRIN    60 tablet    Take 1 tablet (600 mg) by mouth every 6 hours as needed for other (inflammatory pain)    Rheumatoid arthritis with positive rheumatoid factor, involving unspecified site (H)       leflunomide 8 mg/mL suspension    ARAVA     Take 1 tablet by mouth every other day        levonorgestrel 20 MCG/24HR IUD    MIRENA    1 each    Use one device, intrauterine, for up to 5 years.    Encounter for IUD insertion       lisinopril 10 MG tablet    PRINIVIL/ZESTRIL    30 tablet    Take 1 tablet (10 mg) by mouth daily    Benign essential hypertension       loratadine 10 MG tablet    CLARITIN     Take 10 mg by mouth daily        MAGNESIUM GLYCINATE PLUS PO      Take 400 mg by mouth At Bedtime    Benign essential hypertension       PROBIOTIC ADVANCED PO           VITAMIN D (CHOLECALCIFEROL) PO      Take 2,000 Units by mouth daily

## 2018-09-24 ENCOUNTER — THERAPY VISIT (OUTPATIENT)
Dept: PHYSICAL THERAPY | Facility: CLINIC | Age: 43
End: 2018-09-24
Payer: COMMERCIAL

## 2018-09-24 DIAGNOSIS — M25.511 ACUTE PAIN OF RIGHT SHOULDER: ICD-10-CM

## 2018-09-24 PROCEDURE — 97110 THERAPEUTIC EXERCISES: CPT | Mod: GP | Performed by: PHYSICAL THERAPIST

## 2018-09-24 PROCEDURE — 97161 PT EVAL LOW COMPLEX 20 MIN: CPT | Mod: GP | Performed by: PHYSICAL THERAPIST

## 2018-09-24 NOTE — PROGRESS NOTES
[facility] Initial Evaluation  Subjective:  Patient is a 42 year old female presenting with rehab right shoulder hpi. The history is provided by the patient. No  was used.   Ariane Reilly is a 42 year old female with a right shoulder condition.  Condition occurred with:  Unknown cause.  Condition occurred: for unknown reasons.  This is a new condition  Date of MD order for this episode was 9-20-18. Ariane c/o R shoulder pain with onset over the past 2 mos.  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   Medical hx of RA  Exercise-elliptical, treadmill or outside walking. Tries for 2-3x/wk for 30'.    Patient reports pain:  Anterior.  Radiates to: more tension in neck with increased work load.  Pain is described as aching and sharp (arm feels weak with sharp pain) and is intermittent and reported as 3/10 and 8/10.  Associated symptoms:  Catching, locking, loss of motion/stiffness and loss of strength (hot anterior). Pain is worse in the P.M. and worse in the A.M..  Symptoms are exacerbated by lying on extremity, using arm overhead, lifting, using arm at shoulder level and certain positions and relieved by rest.  Since onset symptoms are unchanged.        General health as reported by patient is fair.  Pertinent medical history includes:  High blood pressure, overweight and rheumatoid arthritis.  Medical allergies: no.  Other surgeries include:  Orthopedic surgery (B ankles R wrist).  Current medications:  Anti-inflammatory and other (DMRD, biologic for RA).  Current occupation is Dental hygenist.  Patient is working in normal job without restrictions.  Primary job tasks include:  Prolonged sitting and repetitive tasks.    Barriers include:  None as reported by the patient.    Red flags:  None as reported by the patient.                        Objective:  Standing Alignment:    Cervical/Thoracic:  Forward  head  Shoulder/upper extremity deviations alignment: elevated upper traps and overuse with u/e movements.                                       Shoulder Evaluation:  ROM:  AROM:    Flexion:  Left:  Wnl    Right:  130    Abduction:  Left: wnl   Right:  160    Internal Rotation:  Left:  Wnl    Right:  Wnl  External Rotation:  Left:  Wnl    Right:  Wnl                PROM:    Flexion:  Right: 170      Abduction:  Right:  160 pain                          Strength:  : substitutes with UT's for stability when testing.  Flexion: Left:5/5   Pain:    Right: 5/5     Pain:   Extension:  Left: 5/5    Pain:    Right: 5/5    Pain:  Abduction:  Left: 5/5  Pain:    Right: 5/5      Pain:++  Adduction:  Left: 5/5    Pain:    Right: 5/5     Pain:  Internal Rotation:  Left:5/5     Pain:    Right: 5/5     Pain:  External Rotation:   Left:5/5     Pain:   Right:5/5     Pain:        Elbow Flexion:  Left:5/5     Pain:    Right:5/5     Pain:  Elbow Extension:  Left:5/5     Pain:    Right:5/5     Pain:    Special Tests:      Right shoulder positive for the following special tests:Impingement  Right shoulder negative for the following special tests:Rotator cuff tear  Palpation:      Left shoulder tenderness not present at: Levator; Rhomboids or Upper Trap  Right shoulder tenderness present at: Supraspinatus; Deltoid and Bicipital Groove  Right shoulder tenderness not present at:Clavicle; Sternoclavicular; Acrimioclavicular; Biceps; Triceps; Infraspinatus; Teres Minor; Subscapularis; Levator; Rhomboids or Upper Trap  Mobility Tests:            Scapulothoracic left:  Hypermobile  Scapulothoracic right:  Hypermobile                                       General     ROS    Assessment/Plan:    Patient is a 42 year old female with right side shoulder complaints.    Patient has the following significant findings with corresponding treatment plan.                Diagnosis 1:  R shoulder pain  Pain -  manual therapy, self management, education and  home program  Decreased ROM/flexibility - manual therapy, therapeutic exercise and home program  Decreased joint mobility - manual therapy, therapeutic exercise and home program  Decreased (scapular stabilizers)strength - therapeutic exercise, therapeutic activities and home program  Decreased proprioception - neuro re-education, therapeutic activities and home program  Impaired muscle performance - neuro re-education and home program  Decreased function - therapeutic activities and home program  Impaired posture - neuro re-education and home program    Therapy Evaluation Codes:   1) History comprised of:   Personal factors that impact the plan of care:      Profession.    Comorbidity factors that impact the plan of care are:      Rheumatoid arthritis.     Medications impacting care: for arthritis.  2) Examination of Body Systems comprised of:   Body structures and functions that impact the plan of care:      Shoulder.   Activity limitations that impact the plan of care are:      Bathing, Cooking, Driving, Dressing, Lifting, Working, Sleeping, Laying down and reaching overhead.  3) Clinical presentation characteristics are:   Stable/Uncomplicated.  4) Decision-Making    Low complexity using standardized patient assessment instrument and/or measureable assessment of functional outcome.  Cumulative Therapy Evaluation is: Low complexity.    Previous and current functional limitations:  (See Goal Flow Sheet for this information)    Short term and Long term goals: (See Goal Flow Sheet for this information)     Communication ability:  Patient appears to be able to clearly communicate and understand verbal and written communication and follow directions correctly.  Treatment Explanation - The following has been discussed with the patient:   RX ordered/plan of care  Anticipated outcomes  Possible risks and side effects  This patient would benefit from PT intervention to resume normal activities.   Rehab potential is  good.    Frequency:  1 X week, once daily  Duration:  for 4-6 weeks  Discharge Plan:  Achieve all LTG.  Independent in home treatment program.  Reach maximal therapeutic benefit.    Please refer to the daily flowsheet for treatment today, total treatment time and time spent performing 1:1 timed codes.

## 2018-09-24 NOTE — MR AVS SNAPSHOT
After Visit Summary   9/24/2018    Ariane Reilly    MRN: [id number]           Patient Information     Date Of Birth          1975        Visit Information        Provider Department      9/24/2018 3:10 PM Joanne Garcia PT [facility] [place] [facility]        Today's Diagnoses     Acute pain of right shoulder           Follow-ups after your visit        Your next 10 appointments already scheduled     Oct 08, 2018  9:00 AM CDT   SHORT with Tasha Tran MD   [facility]    [address] [address]   [phone]            Oct 08, 2018  1:50 PM CDT   [facility] Extremity with Joanne Garcia PT   [facility] [place] [facility] ([facility] [place])    [address]  [place] [address]   [phone]            Oct 15, 2018  1:10 PM CDT   [facility] Extremity with Joanne Garcia PT   [facility] [place] [facility] ([facility] [place])    [address]  [place] [address]   [phone]            Oct 24, 2018  8:10 AM CDT   LIN[facility] Extremity with Joanne Garcia PT   [facility] [place] [facility] ([facility] [place])    [address]  [place] [address]   [phone]              Who to contact     If you have questions or need follow up information about today's clinic visit or your schedule please contact [facility] directly at [phone].  Normal or non-critical lab and imaging results will be communicated to you by MyChart, letter or phone within 4 business days after the clinic has received the results. If you do not hear from us within 7 days, please contact the clinic through MyChart or phone. If you have a critical or abnormal lab result, we will notify you by phone as soon as possible.  Submit refill requests through [email or web address] or call your pharmacy and they will forward the refill request to  us. Please allow 3 business days for your refill to be completed.          Additional Information About Your Visit        MyChart Information     iHireHelphart gives you secure access to your electronic health record. If you see a primary care provider, you can also send messages to your care team and make appointments. If you have questions, please call your primary care clinic.  If you do not have a primary care provider, please call [phone] and they will assist you.        Care EveryWhere ID     This is your Care EveryWhere ID. This could be used by other organizations to access your [facility] medical records  [id number]        Your Vitals Were     Last Period                   09/14/2018 (Exact Date)            Blood Pressure from Last 3 Encounters:   09/20/18 (!) 160/98   09/11/18 150/84   01/05/18 120/58    Weight from Last 3 Encounters:   09/20/18 113.8 kg (250 lb 13.6 oz)   09/11/18 114.8 kg (253 lb)   01/04/18 123.6 kg (272 lb 6.4 oz)              We Performed the Following     [facility] Inital Eval Report     [facility] PT, HAND, AND CHIROPRACTIC REFERRAL     PT Eval, Low Complexity (06121)     Therapeutic Exercises        Primary Care Provider Office Phone # Fax #    Tasha Tran [phone]       [address] [address]        Equal Access to Services     [facility] ZAHEER : Hadii aad ku hadasho Soomaali, waaxda luqadaha, qaybta kaalmada adeegjessicada, justin bland. So [facility] [phone].    ATENCIÓN: Si habla español, tiene a levine disposición servicios gratuitos de asistencia lingüística. Llame al [phone].    We comply with applicable federal civil rights laws and [place] laws. We do not discriminate on the basis of race, color, national origin, age, disability, sex, sexual orientation, or gender identity.            Thank you!     Thank you for choosing [facility]  for your care. Our goal is always to provide you with excellent  care. Hearing back from our patients is one way we can continue to improve our services. Please take a few minutes to complete the written survey that you may receive in the mail after your visit with us. Thank you!             Your Updated Medication List - Protect others around you: Learn how to safely use, store and throw away your medicines at www.disposemymeds.org.          This list is accurate as of 9/24/18  5:38 PM.  Always use your most recent med list.                   Brand Name Dispense Instructions for use Diagnosis    ACTEMRA SC      Inject Subcutaneous once a week        aspirin - buffered 325 MG Tabs tablet    ASCRIPTIN    60 each    Take 1 tablet (325 mg) by mouth daily    Rheumatoid arthritis with positive rheumatoid factor, involving unspecified site (H)       BIOTIN PO      Take 5,000 mcg by mouth daily        fluticasone-salmeterol 250-50 MCG/DOSE diskus inhaler    ADVAIR     Inhale 1 puff into the lungs 2 times daily as needed        ibuprofen 600 MG tablet    ADVIL/MOTRIN    60 tablet    Take 1 tablet (600 mg) by mouth every 6 hours as needed for other (inflammatory pain)    Rheumatoid arthritis with positive rheumatoid factor, involving unspecified site (H)       leflunomide 8 mg/mL suspension    ARAVA     Take 1 tablet by mouth every other day        levonorgestrel 20 MCG/24HR IUD    MIRENA    1 each    Use one device, intrauterine, for up to 5 years.    Encounter for IUD insertion       lisinopril 10 MG tablet    PRINIVIL/ZESTRIL    30 tablet    Take 1 tablet (10 mg) by mouth daily    Benign essential hypertension       loratadine 10 MG tablet    CLARITIN     Take 10 mg by mouth daily        MAGNESIUM GLYCINATE PLUS PO      Take 400 mg by mouth At Bedtime    Benign essential hypertension       PROBIOTIC ADVANCED PO           VITAMIN D (CHOLECALCIFEROL) PO      Take 2,000 Units by mouth daily

## 2018-10-08 ENCOUNTER — THERAPY VISIT (OUTPATIENT)
Dept: PHYSICAL THERAPY | Facility: CLINIC | Age: 43
End: 2018-10-08
Payer: COMMERCIAL

## 2018-10-08 DIAGNOSIS — M25.511 ACUTE PAIN OF RIGHT SHOULDER: ICD-10-CM

## 2018-10-08 PROCEDURE — 97110 THERAPEUTIC EXERCISES: CPT | Mod: GP | Performed by: PHYSICAL THERAPIST

## 2018-10-08 PROCEDURE — 97035 APP MDLTY 1+ULTRASOUND EA 15: CPT | Mod: GP | Performed by: PHYSICAL THERAPIST

## 2018-10-15 ENCOUNTER — THERAPY VISIT (OUTPATIENT)
Dept: PHYSICAL THERAPY | Facility: CLINIC | Age: 43
End: 2018-10-15
Payer: COMMERCIAL

## 2018-10-15 DIAGNOSIS — M25.511 ACUTE PAIN OF RIGHT SHOULDER: ICD-10-CM

## 2018-10-15 PROCEDURE — 97110 THERAPEUTIC EXERCISES: CPT | Mod: GP | Performed by: PHYSICAL THERAPIST

## 2018-10-15 PROCEDURE — 97112 NEUROMUSCULAR REEDUCATION: CPT | Mod: GP | Performed by: PHYSICAL THERAPIST

## 2018-10-22 ENCOUNTER — THERAPY VISIT (OUTPATIENT)
Dept: PHYSICAL THERAPY | Facility: CLINIC | Age: 43
End: 2018-10-22
Payer: COMMERCIAL

## 2018-10-22 DIAGNOSIS — M25.511 ACUTE PAIN OF RIGHT SHOULDER: ICD-10-CM

## 2018-10-22 PROCEDURE — 97112 NEUROMUSCULAR REEDUCATION: CPT | Mod: GP | Performed by: PHYSICAL THERAPIST

## 2018-10-22 PROCEDURE — 97110 THERAPEUTIC EXERCISES: CPT | Mod: GP | Performed by: PHYSICAL THERAPIST

## 2018-11-08 ENCOUNTER — THERAPY VISIT (OUTPATIENT)
Dept: PHYSICAL THERAPY | Facility: CLINIC | Age: 43
End: 2018-11-08
Payer: COMMERCIAL

## 2018-11-08 DIAGNOSIS — M25.511 ACUTE PAIN OF RIGHT SHOULDER: ICD-10-CM

## 2018-11-08 PROCEDURE — 97112 NEUROMUSCULAR REEDUCATION: CPT | Mod: GP | Performed by: PHYSICAL THERAPIST

## 2018-11-08 PROCEDURE — 97110 THERAPEUTIC EXERCISES: CPT | Mod: GP | Performed by: PHYSICAL THERAPIST

## 2018-11-15 ENCOUNTER — TRANSFERRED RECORDS (OUTPATIENT)
Dept: HEALTH INFORMATION MANAGEMENT | Facility: CLINIC | Age: 43
End: 2018-11-15

## 2018-12-07 ENCOUNTER — THERAPY VISIT (OUTPATIENT)
Dept: PHYSICAL THERAPY | Facility: CLINIC | Age: 43
End: 2018-12-07
Payer: COMMERCIAL

## 2018-12-07 DIAGNOSIS — M25.511 ACUTE PAIN OF RIGHT SHOULDER: ICD-10-CM

## 2018-12-07 PROCEDURE — 97110 THERAPEUTIC EXERCISES: CPT | Mod: GP | Performed by: PHYSICAL THERAPIST

## 2018-12-07 NOTE — PROGRESS NOTES
Subjective:  HPI                    Objective:  System    Physical Exam    General     ROS    Assessment/Plan:    DISCHARGE REPORT    Progress reporting period is from 9/24/18 to 12/7/18.     SUBJECTIVE  Subjective: pt reports feeling really good, has only mild pain in R anterolateral shoulder occasionally after looking at phone while leaning onto R arm and elbow for prolonged periods the night before but otherwise does not have pain. admits she is able to sleep on R forearm while on her stomach now without pain and feels ready to manage shoulder independent of PT.   Current Pain level: 1/10   Initial Pain level: 8/10   Changes in function: Yes, see goal flow sheet for change in function   Adverse reactions: None;   ,     The objective findings are from DOS 12/7/18.    OBJECTIVE  Objective: full and painfree AROM equal bilaterally. normal 5/5 strength BUE, sanches-marcelino negative, neer's slightly positive.      ASSESSMENT/PLAN  Updated problem list and treatment plan: Diagnosis 1:  R shoulder pain  STG/LTGs have been met or progress has been made towards goals:  Yes, able to perform all overhead and out to side and behind the back self-cares.  Assessment of Progress: The patient's condition is improving.  The patient has met all of their long term goals.  Self Management Plans:  Patient has been instructed in a home treatment program.  Ariane continues to require the following intervention to meet STG and LTG's: PT intervention is no longer required to meet STG/LTG.  We will discharge this patient from PT.    Recommendations:  This patient is ready to be discharged from therapy and continue their home treatment program.    Please refer to the daily flowsheet for treatment today, total treatment time and time spent performing 1:1 timed codes.

## 2018-12-07 NOTE — MR AVS SNAPSHOT
After Visit Summary   12/7/2018    Ariane Relily    MRN: [id number]           Patient Information     Date Of Birth          1975        Visit Information        Provider Department      12/7/2018 2:50 PM You Plummer, PT [facility]        Today's Diagnoses     Acute pain of right shoulder           Follow-ups after your visit        Who to contact     If you have questions or need follow up information about today's clinic visit or your schedule please contact [facility] directly at [phone].  Normal or non-critical lab and imaging results will be communicated to you by Catarizmhart, letter or phone within 4 business days after the clinic has received the results. If you do not hear from us within 7 days, please contact the clinic through TableNOW or phone. If you have a critical or abnormal lab result, we will notify you by phone as soon as possible.  Submit refill requests through TableNOW or call your pharmacy and they will forward the refill request to us. Please allow 3 business days for your refill to be completed.          Additional Information About Your Visit        MyChart Information     TableNOW gives you secure access to your electronic health record. If you see a primary care provider, you can also send messages to your care team and make appointments. If you have questions, please call your primary care clinic.  If you do not have a primary care provider, please call [phone] and they will assist you.        Care EveryWhere ID     This is your Care EveryWhere ID. This could be used by other organizations to access your [facility] medical records  [id number]         Blood Pressure from Last 3 Encounters:   09/20/18 (!) 160/98   09/11/18 150/84   01/05/18 120/58    Weight from Last 3 Encounters:   09/20/18 113.8 kg (250 lb 13.6 oz)   09/11/18 114.8 kg (253 lb)   01/04/18 123.6 kg (272 lb 6.4 oz)              We Performed the Following      [facility] PROGRESS NOTES REPORT     THERAPEUTIC EXERCISES        Primary Care Provider Office Phone # Fax #    Tasha Tran [phone] [address]  [facility] [address]        Equal Access to Services     ALISHA SCHWAB : Hadsarah marion larson andreas Socris, waaxda luqadaha, qaybta kaalmada adestevieda, justin street laBrianmireya bland. So [facility] [phone].    ATENCIÓN: Si habla español, tiene a levine disposición servicios gratuitos de asistencia lingüística. Llame al [phone].    We comply with applicable federal civil rights laws and [place] laws. We do not discriminate on the basis of race, color, national origin, age, disability, sex, sexual orientation, or gender identity.            Thank you!     Thank you for choosing [facility]  for your care. Our goal is always to provide you with excellent care. Hearing back from our patients is one way we can continue to improve our services. Please take a few minutes to complete the written survey that you may receive in the mail after your visit with us. Thank you!             Your Updated Medication List - Protect others around you: Learn how to safely use, store and throw away your medicines at www.disposemymeds.org.          This list is accurate as of 12/7/18  3:23 PM.  Always use your most recent med list.                   Brand Name Dispense Instructions for use Diagnosis    ACTEMRA SC      Inject Subcutaneous once a week        aspirin - buffered 325 MG Tabs tablet    ASCRIPTIN    60 each    Take 1 tablet (325 mg) by mouth daily    Rheumatoid arthritis with positive rheumatoid factor, involving unspecified site (H)       BIOTIN PO      Take 5,000 mcg by mouth daily        fluticasone-salmeterol 250-50 MCG/DOSE inhaler    ADVAIR     Inhale 1 puff into the lungs 2 times daily as needed        ibuprofen 600 MG tablet    ADVIL/MOTRIN    60 tablet    Take 1 tablet (600 mg) by mouth every 6 hours as needed for other  (inflammatory pain)    Rheumatoid arthritis with positive rheumatoid factor, involving unspecified site (H)       leflunomide 8 mg/mL suspension    ARAVA     Take 1 tablet by mouth every other day        levonorgestrel 20 MCG/24HR IUD    MIRENA    1 each    Use one device, intrauterine, for up to 5 years.    Encounter for IUD insertion       lisinopril 10 MG tablet    PRINIVIL/ZESTRIL    30 tablet    Take 1 tablet (10 mg) by mouth daily    Benign essential hypertension       loratadine 10 MG tablet    CLARITIN     Take 10 mg by mouth daily        MAGNESIUM GLYCINATE PLUS PO      Take 400 mg by mouth At Bedtime    Benign essential hypertension       PROBIOTIC ADVANCED PO           VITAMIN D (CHOLECALCIFEROL) PO      Take 2,000 Units by mouth daily

## 2019-01-30 ENCOUNTER — TELEPHONE (OUTPATIENT)
Dept: FAMILY MEDICINE | Facility: CLINIC | Age: 44
End: 2019-01-30

## 2019-01-30 NOTE — TELEPHONE ENCOUNTER
Panel Management Review      Patient has the following on her problem list:     Hypertension   Last three blood pressure readings:  BP Readings from Last 3 Encounters:   09/20/18 (!) 160/98   09/11/18 150/84   01/05/18 120/58     Blood pressure: FAILED    HTN Guidelines:  Age 18-59 BP range:  Less than 140/90  Age 60-85 with Diabetes:  Less than 140/90  Age 60-85 without Diabetes:  less than 150/90      Composite cancer screening  Chart review shows that this patient is due/due soon for the following None  Summary:    Patient is due/failing the following:   None    Action needed:   Patient needs office visit for BP check.    Type of outreach:    None, my chart message sent out 1/11/2019    Questions for provider review:    None                                                                                                                                    LS     Chart routed to None .

## 2019-06-26 ENCOUNTER — TRANSFERRED RECORDS (OUTPATIENT)
Dept: HEALTH INFORMATION MANAGEMENT | Facility: CLINIC | Age: 44
End: 2019-06-26

## 2019-11-06 ENCOUNTER — HEALTH MAINTENANCE LETTER (OUTPATIENT)
Age: 44
End: 2019-11-06

## 2020-02-16 ENCOUNTER — HEALTH MAINTENANCE LETTER (OUTPATIENT)
Age: 45
End: 2020-02-16

## 2020-06-29 ENCOUNTER — TRANSFERRED RECORDS (OUTPATIENT)
Dept: HEALTH INFORMATION MANAGEMENT | Facility: CLINIC | Age: 45
End: 2020-06-29

## 2020-11-05 ENCOUNTER — VIRTUAL VISIT (OUTPATIENT)
Dept: FAMILY MEDICINE | Facility: OTHER | Age: 45
End: 2020-11-05

## 2020-11-05 NOTE — PROGRESS NOTES
"Date: 2020 13:34:37  Clinician: Pinky Ambriz  Clinician NPI: [id number]  Patient: Ariane Reilly  Patient : 1975  Patient Address: [address], [place] [address]  Patient Phone: [phone]  Visit Protocol: URI  Patient Summary:  Ariane is a 44 year old ( : 1975 ) female who initiated a [facility] Visit for COVID-19 (Coronavirus) evaluation and screening. When asked the question \"Please sign me up to receive news, health information and promotions. \", Ariane responded \"No\".    When asked when her symptoms started, Ariane reported that she does not have any symptoms.   She denies taking antibiotic medication in the past month and having recent facial or sinus surgery in the past 60 days.    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 She provides direct patient care. In the past 14 days, Ariane has worked or volunteered at a healthcare facility or a group living setting. 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   Ariane has had a close contact with a laboratory-confirmed COVID-19 patient in the last 14 days. She was exposed at her work. Date Ariane was exposed to the laboratory-confirmed COVID-19 patient: 10/29/2020   Additional information about contact with COVID-19 (Coronavirus) patient as reported by the patient (free text): A co-worker tested positive period we were in an enclosed lunch room / break room setting for about 45 minutes last week when she had symptoms. Both of us had our masks removed while eating our lunch.   Ariane is not living in the same household with the COVID-19 positive patient. She was in an enclosed space for greater than 15 minutes with the COVID-19 patient.   During the encounter, neither were wearing masks.   Since December " 2019, Ariane has not been tested for COVID-19 and has had upper respiratory infection (URI) or influenza-like illness.      Date(s) of previous URI or influenza-like illness (free-text): I have no record of the dates. Common cold symptoms are typical in the winter.     Symptoms Ariane experienced during previous URI or influenza-like illness as reported by the patient (free-text): Cough, stuffy, runny nose.        Pertinent medical history  Ariane does not get yeast infections when she takes antibiotics.   Ariane does not need a return to work/school note.   Weight: 265 lbs   Ariane does not smoke or use smokeless tobacco.   She is not sure if she is pregnant and denies breastfeeding. She is currently menstruating.   Additional information as reported by the patient (free text): I have had rheumatoid arthritis and have been on immune suppressing medications for 16 years.   Weight: 265 lbs    MEDICATIONS: Xeljanz oral, ALLERGIES: NKDA  Clinician Response:  Dear Ariane,   Based on your exposure to COVID-19 (coronavirus), we would like to test you for this virus.  1. Please call [phone] to schedule your visit. Explain that you were referred by [facility] to have a COVID-19 test. Be [facility].  * If you need to schedule in [facility] please call [phone] or for [employer] employees please call [phone].   * If you need to schedule in the [place] area please call [phone]. [place] employees call [phone].   The following will serve as your written order for this COVID Test, ordered by me, for the indication of suspected COVID [Z20.828]: The test will be ordered in [employer], our electronic health record, after you are scheduled. It will show as ordered and authorized by Jovani Johnson MD.  Order: COVID-19 (coronavirus) PCR for ASYMPTOMATIC EXPOSURE testing from [facility].   If you know you have had close contact with someone who tested positive, you should be quarantined  for 14 days after this exposure. You should stay in quarantine for the14 days even if the covid test is negative, the optimal time to test after exposure is 5-7 days from the exposure  Quarantine means   What should I do?  For safety, it's very important to follow these rules. Do this for 14 days after the date you were last exposed to the virus..  Stay home and away from others. Don't go to school or anywhere else. Generally quarantine means staying home from work but there are some exceptions to this. Please contact your workplace.   No hugging, kissing or shaking hands.  Don't let anyone visit.  Cover your mouth and nose with a mask, tissue or washcloth to avoid spreading germs.  Wash your hands and face often. Use soap and water.  What are the symptoms of COVID-19?  The most common symptoms are cough, fever and trouble breathing. Less common symptoms include headache, body aches, fatigue (feeling very tired), chills, sore throat, stuffy or runny nose, diarrhea (loose poop), loss of taste or smell, belly pain, and nausea or vomiting (feeling sick to your stomach or throwing up).  After 14 days, if you have still don't have symptoms, you likely don't have this virus.  If you develop symptoms, follow these guidelines.  If you're normally healthy: Please start another [facility] visit to report your symptoms. Go to [email or web address].  If you have a serious health problem (like cancer, heart failure, an organ transplant or kidney disease): Call your specialty clinic. Let them know that you might have COVID-19.  2. When it's time for your COVID test:  Stay at least 6 feet away from others. (If someone will drive you to your test, stay in the backseat, as far away from the  as you can.)  Cover your mouth and nose with a mask, tissue or washcloth.  Go straight to the testing site. Don't make any stops on the way there or back.  Please note  Caregivers in these groups are at risk for severe illness due to COVID-19:  o People  65 years and older  o People who live in a nursing home or long-term care facility  o People with chronic disease (lung, heart, cancer, diabetes, kidney, liver, immunologic)  o People who have a weakened immune system, including those who:  Are in cancer treatment  Take medicine that weakens the immune system, such as corticosteroids  Had a bone marrow or organ transplant  Have an immune deficiency  Have poorly controlled HIV or AIDS  Are obese (body mass index of 40 or higher)  Smoke regularly  Where can I get more information?  [facility] -- About COVID-19: [email or web address]  CDC -- What to Do If You're Sick: www.cdc.gov/coronavirus/2019-ncov/about/steps-when-sick.html  CDC -- Ending Home Isolation: www.cdc.gov/coronavirus/2019-ncov/hcp/disposition-in-home-patients.html  [facility] -- Caring for Someone: www.cdc.gov/coronavirus/2019-ncov/if-you-are-sick/care-for-someone.html  [facility] -- Interim Guidance for Hospital Discharge to Home: www.[facility].[place]./diseases/coronavirus/hcp/hospdischarge.pdf  [facility] clinical trials (COVID-19 research studies): clinicalaffairs.[facility]-clinical-trials  Below are the COVID-19 hotlines at the [place] Department of [facility] ([facility]). Interpreters are available.  For health questions: Call 440-753-7274 or 1-386.795.3224 (7 a.m. to 7 p.m.)  For questions about schools and childcare: Call 640-317-6750 or 1-963.424.3564 (7 a.m. to 7 p.m.)    Diagnosis: Contact with and (suspected) exposure to other viral communicable diseases  Diagnosis ICD: Z20.828

## 2020-11-07 DIAGNOSIS — Z20.822 SUSPECTED 2019 NOVEL CORONAVIRUS INFECTION: Primary | ICD-10-CM

## 2020-11-08 DIAGNOSIS — Z20.822 SUSPECTED 2019 NOVEL CORONAVIRUS INFECTION: ICD-10-CM

## 2020-11-08 PROCEDURE — U0003 INFECTIOUS AGENT DETECTION BY NUCLEIC ACID (DNA OR RNA); SEVERE ACUTE RESPIRATORY SYNDROME CORONAVIRUS 2 (SARS-COV-2) (CORONAVIRUS DISEASE [COVID-19]), AMPLIFIED PROBE TECHNIQUE, MAKING USE OF HIGH THROUGHPUT TECHNOLOGIES AS DESCRIBED BY CMS-2020-01-R: HCPCS | Performed by: FAMILY MEDICINE

## 2020-11-10 LAB
SARS-COV-2 RNA SPEC QL NAA+PROBE: NOT DETECTED
SPECIMEN SOURCE: NORMAL

## 2020-11-29 ENCOUNTER — HEALTH MAINTENANCE LETTER (OUTPATIENT)
Age: 45
End: 2020-11-29

## 2021-02-01 ENCOUNTER — IMMUNIZATION (OUTPATIENT)
Dept: NURSING | Facility: CLINIC | Age: 46
End: 2021-02-01
Payer: COMMERCIAL

## 2021-02-01 PROCEDURE — 0001A PR COVID VAC PFIZER DIL RECON 30 MCG/0.3 ML IM: CPT

## 2021-02-01 PROCEDURE — 91300 PR COVID VAC PFIZER DIL RECON 30 MCG/0.3 ML IM: CPT

## 2021-02-22 ENCOUNTER — IMMUNIZATION (OUTPATIENT)
Dept: NURSING | Facility: CLINIC | Age: 46
End: 2021-02-22
Attending: STUDENT IN AN ORGANIZED HEALTH CARE EDUCATION/TRAINING PROGRAM
Payer: COMMERCIAL

## 2021-02-22 PROCEDURE — 91300 PR COVID VAC PFIZER DIL RECON 30 MCG/0.3 ML IM: CPT

## 2021-02-22 PROCEDURE — 0002A PR COVID VAC PFIZER DIL RECON 30 MCG/0.3 ML IM: CPT

## 2021-04-08 ENCOUNTER — OFFICE VISIT (OUTPATIENT)
Dept: URGENT CARE | Facility: URGENT CARE | Age: 46
End: 2021-04-08
Payer: COMMERCIAL

## 2021-04-08 VITALS
DIASTOLIC BLOOD PRESSURE: 85 MMHG | TEMPERATURE: 98.3 F | HEART RATE: 72 BPM | OXYGEN SATURATION: 99 % | SYSTOLIC BLOOD PRESSURE: 146 MMHG

## 2021-04-08 DIAGNOSIS — I10 BENIGN ESSENTIAL HYPERTENSION: ICD-10-CM

## 2021-04-08 DIAGNOSIS — H91.92 HEARING LOSS OF LEFT EAR, UNSPECIFIED HEARING LOSS TYPE: Primary | ICD-10-CM

## 2021-04-08 PROCEDURE — 99214 OFFICE O/P EST MOD 30 MIN: CPT | Performed by: FAMILY MEDICINE

## 2021-04-08 RX ORDER — TOFACITINIB 5 MG/1
TABLET, FILM COATED ORAL
COMMUNITY
Start: 2020-12-09

## 2021-04-08 NOTE — PROGRESS NOTES
Chief complaint: left ear plugged     Woke up 5 days now feeling plugged  Not painful initially and a bit sound sensitivity    Thought maybe allergies so she tried over the counter sudafed not much relief  No fevers or chills chest pain or shortness of breath   Other symptoms: no cough, colds, sinus congestion  Fever: No  Tried over the counter medications without relief  No fevers or chills chest pain or shortness of breath   No rash  Ill-contacts: none  Because of persistent and worsening symptoms came in to be seen    BP elevated today but asymptomatic. No headache no blurring of vision no chest pain no shortness of breath no dizziness no unsteadiness no numbness no weakness      Problem list and histories reviewed & adjusted, as indicated.  Additional history: as documented    Problem list, Medication list, Allergies, and Medical/Social/Surgical histories reviewed in EPIC and updated as appropriate.    ROS:  Constitutional, HEENT, cardiovascular, pulmonary, gi and gu systems are negative, except as otherwise noted.    OBJECTIVE:                                                    BP (!) 146/85   Pulse 72   Temp 98.3  F (36.8  C) (Tympanic)   SpO2 99%   There is no height or weight on file to calculate BMI.  GENERAL: healthy, alert and no distress  EYES: pink palpebral conjunctiva, anicteric sclera, pupils equally reactive to light and accomodation, extraocular muscles intact full and equal.  ENT: midline nasal septum, no nasal congestion   Left ear:no tragal tenderness, no mastoid tenderness normal tympaninc membrane   Right ear: no tragal tenderness, no mastoid tenderness normal tympaninc membrane   NECK: no adenopathy, no asymmetry or  Masses  MS: no gross musculoskeletal defects noted, no edema  NEURO: Normal strength and tone, mentation intact and speech normal    Diagnostic Test Results:  No results found for this or any previous visit (from the past 24 hour(s)).     ASSESSMENT/PLAN:                                                           ICD-10-CM    1. Hearing loss of left ear, unspecified hearing loss type  H91.92 OTOLARYNGOLOGY REFERRAL   2. Benign essential hypertension  I10      Referred to ENT - concern for sudden hearing loss. Recommend being seen within 2 weeks of when symptoms started.  BP not at goal - patient plans to follow up with her primary care provider   Aware to come back in if with worsening symptoms or if no relief despite treatment plan  Patient voiced understanding and had no further questions.     MD Oly Ortiz MD  [facility] [place]

## 2021-04-09 ENCOUNTER — OFFICE VISIT (OUTPATIENT)
Dept: AUDIOLOGY | Facility: CLINIC | Age: 46
End: 2021-04-09
Payer: COMMERCIAL

## 2021-04-09 ENCOUNTER — OFFICE VISIT (OUTPATIENT)
Dept: OTOLARYNGOLOGY | Facility: CLINIC | Age: 46
End: 2021-04-09
Payer: COMMERCIAL

## 2021-04-09 VITALS
DIASTOLIC BLOOD PRESSURE: 90 MMHG | OXYGEN SATURATION: 99 % | HEART RATE: 59 BPM | RESPIRATION RATE: 16 BRPM | SYSTOLIC BLOOD PRESSURE: 158 MMHG

## 2021-04-09 DIAGNOSIS — H90.3 SNHL (SENSORY-NEURAL HEARING LOSS), ASYMMETRICAL: Primary | ICD-10-CM

## 2021-04-09 DIAGNOSIS — H81.02 COCHLEAR HYDROPS OF LEFT EAR: ICD-10-CM

## 2021-04-09 DIAGNOSIS — H93.12 TINNITUS OF LEFT EAR: Primary | ICD-10-CM

## 2021-04-09 PROCEDURE — 99207 PR NO CHARGE LOS: CPT | Performed by: AUDIOLOGIST

## 2021-04-09 PROCEDURE — 92550 TYMPANOMETRY & REFLEX THRESH: CPT | Performed by: AUDIOLOGIST

## 2021-04-09 PROCEDURE — 92557 COMPREHENSIVE HEARING TEST: CPT | Performed by: AUDIOLOGIST

## 2021-04-09 PROCEDURE — 99244 OFF/OP CNSLTJ NEW/EST MOD 40: CPT | Performed by: OTOLARYNGOLOGY

## 2021-04-09 RX ORDER — PREDNISONE 20 MG/1
TABLET ORAL
Qty: 20 TABLET | Refills: 0 | Status: SHIPPED | OUTPATIENT
Start: 2021-04-09 | End: 2022-12-19

## 2021-04-09 NOTE — PROGRESS NOTES
I am seeing this patient in consultation for hearing loss left ear at the request of the provider Dr. Oly Arguello.    Chief Complaint - Hearing loss    History of Present Illness - Ariane Reilly is a 45 year old female who presents to me today with hearing loss in the left ear.  It has been present and noticeable for approximately 1 week. It has been constant.  It was sudden in onset. She has left tinnitus. She feels her hearing is a little down. There is no history of recent head trauma, or chronic ear disease or ear surgery. No family history of hearing loss at a young age. The patient denies otorrhea and otalgia. She feels she has a little tilting or dizziness, but denies vertigo. She has had one episode of vertigo. It lasted less than 20 minutes. She did an epley maneuver and it helped. Mom has vertigo, maybe Meniere's disease. Brother lost hearing in one ear suddenly.     Past Medical History -   Patient Active Problem List   Diagnosis     Arthritis of left ankle     Rheumatoid arthritis of foot (H)     Dysmenorrhea     Rheumatoid arthritis, adult (H)     Benign essential hypertension     BMI>35       Current Medications -   Current Outpatient Medications:      BIOTIN PO, Take 5,000 mcg by mouth daily, Disp: , Rfl:      ibuprofen (ADVIL/MOTRIN) 600 MG tablet, Take 1 tablet (600 mg) by mouth every 6 hours as needed for other (inflammatory pain), Disp: 60 tablet, Rfl: 0     leflunomide (ARAVA) 8 mg/mL suspension, Take 1 tablet by mouth every other day, Disp: , Rfl:      levonorgestrel (MIRENA) 20 MCG/24HR IUD, Use one device, intrauterine, for up to 5 years., Disp: 1 each, Rfl: 0     loratadine (CLARITIN) 10 MG tablet, Take 10 mg by mouth daily, Disp: , Rfl:      MAGNESIUM GLYCINATE PLUS PO, Take 400 mg by mouth At Bedtime, Disp: , Rfl:      Probiotic Product (PROBIOTIC ADVANCED PO), , Disp: , Rfl:      Tocilizumab (ACTEMRA SC), Inject Subcutaneous once a week, Disp: , Rfl:      VITAMIN D,  CHOLECALCIFEROL, PO, Take 2,000 Units by mouth daily, Disp: , Rfl:      XELJANZ 5 MG tablet, , Disp: , Rfl:     Allergies -   Allergies   Allergen Reactions     No Known Allergies        Social History -   Social History     Socioeconomic History     Marital status:      Spouse name: Not on file     Number of children: Not on file     Years of education: 17     Highest education level: Not on file   Occupational History     Occupation: dental hygenist   Social Needs     Financial resource strain: Not on file     Food insecurity     Worry: Not on file     Inability: Not on file     Transportation needs     Medical: Not on file     Non-medical: Not on file   Tobacco Use     Smoking status: Never Smoker     Smokeless tobacco: Never Used   Substance and Sexual Activity     Alcohol use: Yes     Comment: sporadically     Drug use: No     Sexual activity: Not Currently     Partners: Male     Birth control/protection: Abstinence, Natural Family Planning   Lifestyle     Physical activity     Days per week: Not on file     Minutes per session: Not on file     Stress: Not on file   Relationships     Social connections     Talks on phone: Not on file     Gets together: Not on file     Attends Oriental orthodox service: Not on file     Active member of club or organization: Not on file     Attends meetings of clubs or organizations: Not on file     Relationship status: Not on file     Intimate partner violence     Fear of current or ex partner: Not on file     Emotionally abused: Not on file     Physically abused: Not on file     Forced sexual activity: Not on file   Other Topics Concern     Parent/sibling w/ CABG, MI or angioplasty before 65F 55M? No   Social History Narrative     Not on file       Family History -   Family History   Problem Relation Age of Onset     Hypertension Mother      Hyperlipidemia Mother      Asthma Mother      Diabetes Father      Arthritis Father      Other Cancer Father         Basal Cell Carcinoma      Obesity Father      Cancer - colorectal Other         great uncle on her mother's side     Diabetes Brother      Hypertension Brother      Hyperlipidemia Brother      Thyroid Disease Brother         Hypo, cancer     Obesity Sister      Obesity Brother        Review of Systems - As per HPI and PMHx, otherwise 10+ comprehensive system review is negative.    Physical Exam  BP (!) 158/90   Pulse 59   Resp 16   SpO2 99%   General - The patient is in no distress.  Alert and oriented to person and place, answers questions and cooperates with examination appropriately.   Voice and Breathing - The patient was breathing comfortably without the use of accessory muscles. There was no wheezing, stridor, or stertor.  The patients voice was clear and strong.  Ears - The auricles are normal. The tympanic membranes are normal in appearance, bony landmarks are intact.  No retraction, perforation, or masses.  No fluid or purulence was seen in the external canal or the middle ear. No evidence of infection of the middle ear or external canal, cerumen was normal in appearance.  Eyes - Extraocular movements intact. PERRL. Sclera were not icteric or injected.  Mouth - Examination of the oral cavity showed pink, healthy mucosa. No lesions or ulcerations noted.  The tongue was mobile and midline.  Throat - The walls of the oropharynx were smooth, symmetric, and had no lesions or ulcerations. The uvula was midline on elevation.    Neck - Soft, non-tender. Palpation of the occipital, submental, submandibular, internal jugular chain, and supraclavicular nodes did not demonstrate any abnormal lymph nodes or masses. Parotid glands had no masses. Palpation of the thyroid was soft and smooth, with no nodules or goiter appreciated.  The trachea was mobile and midline.  Neurological - Cranial nerves 2 through 12 were grossly intact. House-Brackmann grade 1 out of 6 bilaterally.   Cardiovascular - carotid pulses are 2+ bilaterally, regular  rhythm      Audiologic Studies - An audiogram and tympanogram were performed today as part of the evaluation and personally reviewed. The tympanogram shows a normal Type A curve, with normal canal volume and middle ear pressure.  There is no sign of eustachian tube dysfunction or middle ear effusion.  The audiogram showed a significant left-sided low frequency sensorineural hearing loss at 250 and 500 Hz. Normal right hearing.      Assessment and Plan - Ariane Reilly is a 45 year old female who presents to me today with sudden sensorineural hearing loss, low frequency on the right. No vertigo. We discussed this is possibly cochlear hydrops. Mom may have Meniere's and this could be early manifestations of meniere's, but she has no vertigo. We discussed MRI brain to rule-out retrocochlear pathology, but will hold off on that for now. I recommend a prednisone burst and taper. If this fails, we can try hydrochlorothiazide. Recheck hearing following therapy.     Alvaro Barker MD  Otolaryngology  [facility]

## 2021-04-09 NOTE — LETTER
4/9/2021         RE: Ariane Reilly  [address] [facility] [id number]        Dear Colleague,    Thank you for referring your patient, Ariane Reilly, to the [facility]. Please see a copy of my visit note below.    I am seeing this patient in consultation for hearing loss left ear at the request of the provider Dr. Oly Arguello.    Chief Complaint - Hearing loss    History of Present Illness - Ariane Reilly is a 45 year old female who presents to me today with hearing loss in the left ear.  It has been present and noticeable for approximately 1 week. It has been constant.  It was sudden in onset. She has left tinnitus. She feels her hearing is a little down. There is no history of recent head trauma, or chronic ear disease or ear surgery. No family history of hearing loss at a young age. The patient denies otorrhea and otalgia. She feels she has a little tilting or dizziness, but denies vertigo. She has had one episode of vertigo. It lasted less than 20 minutes. She did an epley maneuver and it helped. Mom has vertigo, maybe Meniere's disease. Brother lost hearing in one ear suddenly.     Past Medical History -   Patient Active Problem List   Diagnosis     Arthritis of left ankle     Rheumatoid arthritis of foot (H)     Dysmenorrhea     Rheumatoid arthritis, adult (H)     Benign essential hypertension     BMI>35       Current Medications -   Current Outpatient Medications:      BIOTIN PO, Take 5,000 mcg by mouth daily, Disp: , Rfl:      ibuprofen (ADVIL/MOTRIN) 600 MG tablet, Take 1 tablet (600 mg) by mouth every 6 hours as needed for other (inflammatory pain), Disp: 60 tablet, Rfl: 0     leflunomide (ARAVA) 8 mg/mL suspension, Take 1 tablet by mouth every other day, Disp: , Rfl:      levonorgestrel (MIRENA) 20 MCG/24HR IUD, Use one device, intrauterine, for up to 5 years., Disp: 1 each, Rfl: 0     loratadine (CLARITIN) 10 MG tablet, Take 10 mg by mouth  daily, Disp: , Rfl:      MAGNESIUM GLYCINATE PLUS PO, Take 400 mg by mouth At Bedtime, Disp: , Rfl:      Probiotic Product (PROBIOTIC ADVANCED PO), , Disp: , Rfl:      Tocilizumab (ACTEMRA SC), Inject Subcutaneous once a week, Disp: , Rfl:      VITAMIN D, CHOLECALCIFEROL, PO, Take 2,000 Units by mouth daily, Disp: , Rfl:      XELJANZ 5 MG tablet, , Disp: , Rfl:     Allergies -   Allergies   Allergen Reactions     No Known Allergies        Social History -   Social History     Socioeconomic History     Marital status:      Spouse name: Not on file     Number of children: Not on file     Years of education: 17     Highest education level: Not on file   Occupational History     Occupation: dental hygenist   Social Needs     Financial resource strain: Not on file     Food insecurity     Worry: Not on file     Inability: Not on file     Transportation needs     Medical: Not on file     Non-medical: Not on file   Tobacco Use     Smoking status: Never Smoker     Smokeless tobacco: Never Used   Substance and Sexual Activity     Alcohol use: Yes     Comment: sporadically     Drug use: No     Sexual activity: Not Currently     Partners: Male     Birth control/protection: Abstinence, Natural Family Planning   Lifestyle     Physical activity     Days per week: Not on file     Minutes per session: Not on file     Stress: Not on file   Relationships     Social connections     Talks on phone: Not on file     Gets together: Not on file     Attends Sikh service: Not on file     Active member of club or organization: Not on file     Attends meetings of clubs or organizations: Not on file     Relationship status: Not on file     Intimate partner violence     Fear of current or ex partner: Not on file     Emotionally abused: Not on file     Physically abused: Not on file     Forced sexual activity: Not on file   Other Topics Concern     Parent/sibling w/ CABG, MI or angioplasty before 65F 55M? No   Social History Narrative      Not on file       Family History -   Family History   Problem Relation Age of Onset     Hypertension Mother      Hyperlipidemia Mother      Asthma Mother      Diabetes Father      Arthritis Father      Other Cancer Father         Basal Cell Carcinoma     Obesity Father      Cancer - colorectal Other         great uncle on her mother's side     Diabetes Brother      Hypertension Brother      Hyperlipidemia Brother      Thyroid Disease Brother         Hypo, cancer     Obesity Sister      Obesity Brother        Review of Systems - As per HPI and PMHx, otherwise 10+ comprehensive system review is negative.    Physical Exam  BP (!) 158/90   Pulse 59   Resp 16   SpO2 99%   General - The patient is in no distress.  Alert and oriented to person and place, answers questions and cooperates with examination appropriately.   Voice and Breathing - The patient was breathing comfortably without the use of accessory muscles. There was no wheezing, stridor, or stertor.  The patients voice was clear and strong.  Ears - The auricles are normal. The tympanic membranes are normal in appearance, bony landmarks are intact.  No retraction, perforation, or masses.  No fluid or purulence was seen in the external canal or the middle ear. No evidence of infection of the middle ear or external canal, cerumen was normal in appearance.  Eyes - Extraocular movements intact. PERRL. Sclera were not icteric or injected.  Mouth - Examination of the oral cavity showed pink, healthy mucosa. No lesions or ulcerations noted.  The tongue was mobile and midline.  Throat - The walls of the oropharynx were smooth, symmetric, and had no lesions or ulcerations. The uvula was midline on elevation.    Neck - Soft, non-tender. Palpation of the occipital, submental, submandibular, internal jugular chain, and supraclavicular nodes did not demonstrate any abnormal lymph nodes or masses. Parotid glands had no masses. Palpation of the thyroid was soft and  smooth, with no nodules or goiter appreciated.  The trachea was mobile and midline.  Neurological - Cranial nerves 2 through 12 were grossly intact. House-Brackmann grade 1 out of 6 bilaterally.   Cardiovascular - carotid pulses are 2+ bilaterally, regular rhythm      Audiologic Studies - An audiogram and tympanogram were performed today as part of the evaluation and personally reviewed. The tympanogram shows a normal Type A curve, with normal canal volume and middle ear pressure.  There is no sign of eustachian tube dysfunction or middle ear effusion.  The audiogram showed a significant left-sided low frequency sensorineural hearing loss at 250 and 500 Hz. Normal right hearing.      Assessment and Plan - Ariane Reilly is a 45 year old female who presents to me today with sudden sensorineural hearing loss, low frequency on the right. No vertigo. We discussed this is possibly cochlear hydrops. Mom may have Meniere's and this could be early manifestations of meniere's, but she has no vertigo. We discussed MRI brain to rule-out retrocochlear pathology, but will hold off on that for now. I recommend a prednisone burst and taper. If this fails, we can try hydrochlorothiazide. Recheck hearing following therapy.     Alvaro Barker MD  Otolaryngology  [facility]          Again, thank you for allowing me to participate in the care of your patient.        Sincerely,        Alvaro Barker MD

## 2021-04-09 NOTE — PROGRESS NOTES
AUDIOLOGY REPORT:    Patient was referred from ENT by Dr. Barker for audiology evaluation. The patient reports a sudden loss of hearing in her left ear that started on Saturday morning (4/3/2021). She reports decreased hearing in the left ear as well as tinnitus and sound sensitivity that started at the same time. She reports that she feels a little bit off balance as well. She has tried antihistamines and decongestants, which did not help, and she was seen at urgent care where it was found that her ear looked clear. 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 She reports that her brother has a unilateral hearing loss that had a sudden onset, and her aunt also has a unilateral hearing loss. She reports that her father developed hearing loss with age and noise exposure. The patient reports that she had a burst eardrum as an infant and had a few ear infections as a child, but she has not had ear surgery. She does not have ear pain.    Testing:    Otoscopy:   Otoscopic exam indicates ears are clear of cerumen bilaterally     Tympanograms:    RIGHT: normal eardrum mobility     LEFT:   normal eardrum mobility    Reflexes (reported by stimulus ear):  RIGHT: Ipsilateral is present at normal levels  RIGHT: Contralateral is present at normal levels  LEFT:   Ipsilateral is present at normal levels  LEFT:   Contralateral is present at normal levels    Thresholds:   Pure Tone Thresholds assessed using conventional audiometry with good reliability from 250-8000 Hz bilaterally using insert earphones and circumaural headphones     RIGHT:  normal hearing sensitivity at all tested frequencies    LEFT:    normal hearing sensitivity at all tested frequencies  NOTE: significant asymmetry at 250-500 and 8000 Hz, with the left ear worse    Speech Reception Threshold:    RIGHT: 5 dB HL    LEFT:   10 dB HL  Results are in agreement with pure tone average.     Word Recognition Score:     RIGHT: 100% at 50 dB HL  using NU-6 recorded word list.    LEFT:   100% at 55 dB HL using NU-6 recorded word list.    Discussed results with the patient.     Patient was returned to ENT for follow up.     Christie Lynn, CCC-A  Licensed Audiologist #[id number]  4/9/2021

## 2021-08-10 ENCOUNTER — TRANSFERRED RECORDS (OUTPATIENT)
Dept: HEALTH INFORMATION MANAGEMENT | Facility: CLINIC | Age: 46
End: 2021-08-10

## 2021-08-31 ENCOUNTER — TRANSFERRED RECORDS (OUTPATIENT)
Dept: HEALTH INFORMATION MANAGEMENT | Facility: CLINIC | Age: 46
End: 2021-08-31

## 2021-09-01 ENCOUNTER — TRANSFERRED RECORDS (OUTPATIENT)
Dept: HEALTH INFORMATION MANAGEMENT | Facility: CLINIC | Age: 46
End: 2021-09-01

## 2021-09-19 ENCOUNTER — HEALTH MAINTENANCE LETTER (OUTPATIENT)
Age: 46
End: 2021-09-19

## 2022-01-09 ENCOUNTER — HEALTH MAINTENANCE LETTER (OUTPATIENT)
Age: 47
End: 2022-01-09

## 2022-05-17 ENCOUNTER — OFFICE VISIT (OUTPATIENT)
Dept: FAMILY MEDICINE | Facility: CLINIC | Age: 47
End: 2022-05-17
Payer: COMMERCIAL

## 2022-05-17 VITALS
BODY MASS INDEX: 39.94 KG/M2 | HEIGHT: 70 IN | TEMPERATURE: 96.8 F | OXYGEN SATURATION: 97 % | RESPIRATION RATE: 16 BRPM | WEIGHT: 279 LBS | DIASTOLIC BLOOD PRESSURE: 93 MMHG | HEART RATE: 78 BPM | SYSTOLIC BLOOD PRESSURE: 162 MMHG

## 2022-05-17 DIAGNOSIS — H65.92 OME (OTITIS MEDIA WITH EFFUSION), LEFT: Primary | ICD-10-CM

## 2022-05-17 DIAGNOSIS — I10 BENIGN ESSENTIAL HYPERTENSION: ICD-10-CM

## 2022-05-17 PROCEDURE — 99213 OFFICE O/P EST LOW 20 MIN: CPT | Performed by: PHYSICIAN ASSISTANT

## 2022-05-17 RX ORDER — AMOXICILLIN 875 MG
875 TABLET ORAL 2 TIMES DAILY
Qty: 20 TABLET | Refills: 0 | Status: SHIPPED | OUTPATIENT
Start: 2022-05-17 | End: 2022-05-27

## 2022-05-17 RX ORDER — SPIRONOLACTONE 50 MG/1
50 TABLET, FILM COATED ORAL DAILY
COMMUNITY

## 2022-05-17 ASSESSMENT — PAIN SCALES - GENERAL: PAINLEVEL: NO PAIN (0)

## 2022-05-17 NOTE — PROGRESS NOTES
"  Flavio Thakkar is a 46 year old who presents for cold-like symptoms for the past week. She states it began with throat discomfort and rhinorrhea and has now progressed to ear and facial pressure. She does not report and specific pain with either, no fevers, cough, difficulty breathing. She has had some yellow/green sputum. Patient states the left ear has had the most pressure and now she has limited hearing.     HPI     Acute Illness  Acute illness concerns: sore throat, ear pain  Onset/Duration: 1week  Symptoms:  Fever: no  Chills/Sweats: no  Headache (location?): YES  Sinus Pressure: YES  Conjunctivitis:  YES  Ear Pain: YES:   Rhinorrhea: YES  Congestion: YES  Sore Throat: YES  Cough: YES  Wheeze: no  Decreased Appetite: YES  Nausea: no  Vomiting: no  Diarrhea: no  Dysuria/Freq.: no  Dysuria or Hematuria: no  Fatigue/Achiness: YES  Sick/Strep Exposure: no  Therapies tried and outcome: None      Review of Systems   Constitutional, HEENT, cardiovascular, pulmonary, GI, , musculoskeletal, neuro, skin, endocrine and psych systems are negative, except as otherwise noted.      Objective    BP (!) 162/93   Pulse 78   Temp 96.8  F (36  C) (Tympanic)   Resp 16   Ht 1.765 m (5' 9.5\")   Wt 126.6 kg (279 lb)   LMP 04/17/2022   SpO2 97%   BMI 40.61 kg/m    Body mass index is 40.61 kg/m .     Physical Exam  Constitutional:       Appearance: Normal appearance. She is obese.   HENT:      Head: Normocephalic.      Right Ear: Hearing, tympanic membrane, ear canal and external ear normal.      Left Ear: Ear canal and external ear normal. Tympanic membrane is erythematous and bulging.      Nose: Rhinorrhea present.      Mouth/Throat:      Mouth: Mucous membranes are moist.      Pharynx: Oropharynx is clear. Posterior oropharyngeal erythema present. No oropharyngeal exudate.   Eyes:      General:         Right eye: No discharge.         Left eye: No discharge.      Extraocular Movements: Extraocular movements " "intact.      Conjunctiva/sclera: Conjunctivae normal.      Pupils: Pupils are equal, round, and reactive to light.   Cardiovascular:      Rate and Rhythm: Normal rate and regular rhythm.      Pulses: Normal pulses.      Heart sounds: Normal heart sounds. No murmur heard.    No friction rub.   Pulmonary:      Effort: Pulmonary effort is normal.      Breath sounds: Normal breath sounds. No wheezing or rhonchi.   Musculoskeletal:      Cervical back: Normal range of motion. No tenderness.   Lymphadenopathy:      Cervical: No cervical adenopathy.   Neurological:      General: No focal deficit present.      Mental Status: She is alert and oriented to person, place, and time. Mental status is at baseline.          Assessment & Plan     (H65.92) OME (otitis media with effusion), left  (primary encounter diagnosis)  Comment: TM is bulging and erythematous on the left; antibiotic treatment indicated for management of AOM  Plan: amoxicillin (AMOXIL) 875 MG tablet - 10 days    (I10) Benign essential hypertension  Comment: Continue to monitor blood pressure and follow-up with PCP for further evaluation  Plan: PCP consultation for blood pressure management       BMI:   Estimated body mass index is 40.61 kg/m  as calculated from the following:    Height as of this encounter: 1.765 m (5' 9.5\").    Weight as of this encounter: 126.6 kg (279 lb).   Weight management plan: Discussed healthy diet and exercise guidelines      Return in about 2 weeks (around 5/31/2022) for further managemenr if symptoms fail to resolve or worsen.    CARLOS Du [facility]    ----- Services Performed by a MEDICAL STUDENT in Presence of ATTENDING Physician-------     Phuc BRISCOE        The student acted as a scribe and the encounter documented above was completely performed by myself and the documentation reflects the work I have performed today.  Nabeel Denton PA-C    "

## 2022-06-23 ENCOUNTER — TELEPHONE (OUTPATIENT)
Dept: FAMILY MEDICINE | Facility: CLINIC | Age: 47
End: 2022-06-23

## 2022-06-23 NOTE — TELEPHONE ENCOUNTER
Patient Quality Outreach    Patient is due for the following:   Cervical Cancer Screening - PAP Needed    NEXT STEPS:   will send [employer] message    Type of outreach:    Sent [employer] message.    Next Steps:  Reach out within 90 days via [employer].    Max number of attempts reached: No. Will try again in 90 days if patient still on fail list.    Questions for provider review:    None     Nadiya Pritchard MA

## 2022-11-21 ENCOUNTER — HEALTH MAINTENANCE LETTER (OUTPATIENT)
Age: 47
End: 2022-11-21

## 2022-12-01 ENCOUNTER — TRANSFERRED RECORDS (OUTPATIENT)
Dept: HEALTH INFORMATION MANAGEMENT | Facility: CLINIC | Age: 47
End: 2022-12-01

## 2022-12-01 LAB
ALT SERPL-CCNC: 30 IU/L (ref 5–35)
AST SERPL-CCNC: 27 U/L (ref 5–34)
CREATININE (EXTERNAL): 0.53 MG/DL (ref 0.5–1.3)

## 2022-12-19 ENCOUNTER — VIRTUAL VISIT (OUTPATIENT)
Dept: FAMILY MEDICINE | Facility: CLINIC | Age: 47
End: 2022-12-19
Payer: COMMERCIAL

## 2022-12-19 DIAGNOSIS — M05.9 RHEUMATOID ARTHRITIS WITH POSITIVE RHEUMATOID FACTOR, INVOLVING UNSPECIFIED SITE (H): ICD-10-CM

## 2022-12-19 DIAGNOSIS — U07.1 INFECTION DUE TO 2019 NOVEL CORONAVIRUS: Primary | ICD-10-CM

## 2022-12-19 PROCEDURE — 99214 OFFICE O/P EST MOD 30 MIN: CPT | Mod: 95 | Performed by: FAMILY MEDICINE

## 2022-12-19 RX ORDER — KETOCONAZOLE 20 MG/ML
SHAMPOO TOPICAL
COMMUNITY
Start: 2022-03-21

## 2022-12-19 RX ORDER — CYCLOSPORINE 0.5 MG/ML
1 EMULSION OPHTHALMIC 2 TIMES DAILY
COMMUNITY

## 2022-12-19 RX ORDER — AMMONIUM LACTATE 12 G/100G
LOTION TOPICAL
COMMUNITY
Start: 2022-02-02

## 2022-12-19 RX ORDER — METHOTREXATE 25 MG/ML
INJECTION, SOLUTION INTRA-ARTERIAL; INTRAMUSCULAR; INTRAVENOUS
COMMUNITY
Start: 2022-12-01

## 2022-12-19 RX ORDER — FOLIC ACID 1 MG/1
1000 TABLET ORAL DAILY
COMMUNITY
Start: 2022-12-01

## 2022-12-19 NOTE — ASSESSMENT & PLAN NOTE
COVID vaccines are up-to-date.  With positive test will start on pack Slo-Bid.  As per guidance patient instructed to take her Xeljanz at half dose for the next 8 days and then return to normal dosing on day 9.  Patient instructed that today is day 1 of treatment and not day 0.  Side effects precautions reviewed.

## 2022-12-19 NOTE — PROGRESS NOTES
Ariane is a 47 year old who is being evaluated via a billable video visit.      How would you like to obtain your AVS? MyChart  If the video visit is dropped, the invitation should be resent by: Text to cell phone: [phone]  Will anyone else be joining your video visit? No    Video-Visit Details    Video Start Time: 7:37 AM    Type of service:  Video Visit    Video End Time:7:54 AM    Originating Location (pt. Location): Home    Distant Location (provider location):  On-site    Platform used for Video Visit: [employer]    Problem List Items Addressed This Visit     Rheumatoid arthritis, adult (H)     COVID vaccines are up-to-date.  With positive test will start on pack Slo-Bid.  As per guidance patient instructed to take her Xeljanz at half dose for the next 8 days and then return to normal dosing on day 9.  Patient instructed that today is day 1 of treatment and not day 0.  Side effects precautions reviewed.         Relevant Medications    ammonium lactate (LAC-HYDRIN) 12 % external lotion    ketoconazole (NIZORAL) 2 % external shampoo    methotrexate 50 MG/2ML injection    metroNIDAZOLE (METROCREAM) 0.75 % external cream   Other Visit Diagnoses     Infection due to 2019 novel coronavirus    -  Primary    Relevant Medications    nirmatrelvir and ritonavir (PAXLOVID) therapy pack             Subjective   Ariane is a 47 year old who presents for the following health issues   Chief Complaint   Patient presents with     Covid Concern     Positive Test on 12/17/22; Sx's started 12/17/22 w/Cough and Scratchy Throat      Patient with cough and cold symptoms.  Did COVID test 2 days ago and came up positive.  Flu vaccine as well as COVID vaccines are up-to-date.  Works as a dental hygienist.  Still having muscle aches and a bit of a cough today.  Medications were reviewed and is on Xeljanz for underlying rheumatoid arthritis.  Normal kidney function.       Review of Systems   All other systems reviewed and are  negative.           Objective    Vitals - Patient Reported  Temperature (Patient Reported): 99  F (37.2  C)        Physical Exam  Nursing note reviewed.   Constitutional:       General: She is not in acute distress.     Appearance: Normal appearance. She is not ill-appearing.   HENT:      Head: Normocephalic and atraumatic.   Eyes:      Extraocular Movements: Extraocular movements intact.      Conjunctiva/sclera: Conjunctivae normal.   Pulmonary:      Effort: Pulmonary effort is normal.   Neurological:      Mental Status: She is alert and oriented to person, place, and time.   Psychiatric:         Attention and Perception: Attention normal.         Mood and Affect: Mood normal.         Speech: Speech normal.         Thought Content: Thought content normal.              This note has been dictated using voice recognition software. Any grammatical or context distortions are unintentional and inherent to the software

## 2022-12-25 ENCOUNTER — HEALTH MAINTENANCE LETTER (OUTPATIENT)
Age: 47
End: 2022-12-25

## 2023-04-16 ENCOUNTER — HEALTH MAINTENANCE LETTER (OUTPATIENT)
Age: 48
End: 2023-04-16

## 2023-07-17 ENCOUNTER — MYC MEDICAL ADVICE (OUTPATIENT)
Dept: FAMILY MEDICINE | Facility: CLINIC | Age: 48
End: 2023-07-17

## 2023-07-17 ENCOUNTER — PATIENT OUTREACH (OUTPATIENT)
Dept: FAMILY MEDICINE | Facility: CLINIC | Age: 48
End: 2023-07-17

## 2023-07-17 NOTE — TELEPHONE ENCOUNTER
Patient Quality Outreach    Patient is due for the following:   Physical Preventive Adult Physical    Next Steps:   Schedule a Adult Preventative    Type of outreach:    Sent [employer] message.      Questions for provider review:    None           Isidro Hayden Jr., ARCELIA  Chart routed to Care Team.

## 2023-08-18 NOTE — TELEPHONE ENCOUNTER
Patient Quality Outreach    Patient is due for the following:   Physical Preventive Adult Physical    Next Steps:   Schedule a Adult Preventative    Type of outreach:    Phone, left message for patient/parent to call back.    Next Steps:  Reach out within 90 days via Phone.    Max number of attempts reached: Yes. Will try again in 90 days if patient still on fail list.    Questions for provider review:    None           Isidro Hayden Jr., RMA

## 2023-08-24 ENCOUNTER — TRANSFERRED RECORDS (OUTPATIENT)
Dept: HEALTH INFORMATION MANAGEMENT | Facility: CLINIC | Age: 48
End: 2023-08-24
Payer: COMMERCIAL

## 2024-02-26 ENCOUNTER — TRANSFERRED RECORDS (OUTPATIENT)
Dept: HEALTH INFORMATION MANAGEMENT | Facility: CLINIC | Age: 49
End: 2024-02-26
Payer: COMMERCIAL

## 2024-02-26 LAB
ALT SERPL-CCNC: 17 IU/L (ref 5–35)
AST SERPL-CCNC: 20 IU/L (ref 5–34)
CREATININE (EXTERNAL): 0.64 MG/DL (ref 0.5–1.3)

## 2024-03-15 ENCOUNTER — OFFICE VISIT (OUTPATIENT)
Dept: URGENT CARE | Facility: URGENT CARE | Age: 49
End: 2024-03-15
Payer: COMMERCIAL

## 2024-03-15 VITALS
DIASTOLIC BLOOD PRESSURE: 83 MMHG | BODY MASS INDEX: 36.39 KG/M2 | WEIGHT: 250 LBS | OXYGEN SATURATION: 99 % | HEART RATE: 66 BPM | SYSTOLIC BLOOD PRESSURE: 159 MMHG | RESPIRATION RATE: 18 BRPM | TEMPERATURE: 98.2 F

## 2024-03-15 DIAGNOSIS — J10.1 INFLUENZA A: Primary | ICD-10-CM

## 2024-03-15 LAB
FLUAV AG SPEC QL IA: POSITIVE
FLUBV AG SPEC QL IA: NEGATIVE

## 2024-03-15 PROCEDURE — 87804 INFLUENZA ASSAY W/OPTIC: CPT | Performed by: NURSE PRACTITIONER

## 2024-03-15 PROCEDURE — 99213 OFFICE O/P EST LOW 20 MIN: CPT | Performed by: NURSE PRACTITIONER

## 2024-03-15 NOTE — PROGRESS NOTES
Ariane Reilly  presents complaining of flu-like symptoms: fairly abrupt onset of fevers, chills, myalgias, with some congestion, sore throat and cough for 5 days. Denies dyspnea or wheezing. Denies nausea, vomiting, diarrhea.     Past Medical History:   Diagnosis Date    Arthritis     rheumatoid arthritis    Benign essential hypertension 9/20/2018    Family history of diabetes mellitus type II     Gastro-oesophageal reflux disease     PONV (postoperative nausea and vomiting)      Current Outpatient Medications   Medication    ammonium lactate (LAC-HYDRIN) 12 % external lotion    cycloSPORINE (RESTASIS) 0.05 % ophthalmic emulsion    folic acid (FOLVITE) 1 MG tablet    ibuprofen (ADVIL/MOTRIN) 600 MG tablet    ketoconazole (NIZORAL) 2 % external shampoo    levonorgestrel (MIRENA) 20 MCG/24HR IUD    methotrexate 50 MG/2ML injection    metroNIDAZOLE (METROCREAM) 0.75 % external cream    spironolactone (ALDACTONE) 50 MG tablet    VITAMIN D, CHOLECALCIFEROL, PO    XELJANZ 5 MG tablet     No current facility-administered medications for this visit.        Allergies   Allergen Reactions    No Known Allergies     Lisinopril Cough       OBJECTIVE:  BP (!) 159/83   Pulse 66   Temp 98.2  F (36.8  C) (Tympanic)   Resp 18   Wt 113.4 kg (250 lb)   SpO2 99%   BMI 36.39 kg/m    GENERAL: Ill appearing but pleasant and interactive. No acute distress.  HEENT: Mild injection of conjunctiva.  Clear nasal discharge.  Oropharynx moist and clear.   NECK: supple and free of adenopathy or masses, the thyroid is normal without enlargement or nodules.  CHEST:  clear, no wheezing or rales. Normal symmetric air entry throughout both lung fields. No chest wall deformities or tenderness.  HEART:  S1 and S2 normal, no murmurs, clicks, gallops or rubs. Regular rate and rhythm.  SKIN:  Only benign skin findings. No unusual rashes or suspicious skin lesions noted.     Rapid Flu Swab: positive    ASSESSMENT:    (J10.1) Influenza A   (primary encounter diagnosis)      PLAN:  Discussed general respiratory tract infection care including importance of hydration, rest, over the counter therapies and techniques to prevent future infection as well as transmission to others.  Discussed signs or symptoms that would indicate need for recheck.   Out of window for tamiflu to be effective  Discussed risks, benefits, side effects, and alternatives of therapy.    JUDSON Dean CNP

## 2024-03-18 ENCOUNTER — TELEPHONE (OUTPATIENT)
Dept: FAMILY MEDICINE | Facility: CLINIC | Age: 49
End: 2024-03-18
Payer: COMMERCIAL

## 2024-03-18 NOTE — TELEPHONE ENCOUNTER
New Medication Request      What medication are you requesting?: Advair diskus inhaler    Reason for medication request: Seen Yvonne Pratt in Urgent Care 3/15/24, and per patient, she was advised that if she felt she needed this medication to call back and request it.    Have you taken this medication before?: Yes:     Controlled Substance Agreement on file:   CSA -- Patient Level:    CSA: None found at the patient level.         Patient offered an appointment? No    Preferred Pharmacy: [facility]      Could we send this information to you in [facility] or would you prefer to receive a phone call?:   Patient would prefer a phone call   Okay to leave a detailed message?: Yes at Cell number on file:    Telephone Information:   Mobile [phone]

## 2024-03-19 NOTE — TELEPHONE ENCOUNTER
Please Call pt - as was seen several days ago, if is still having sxs that would need advair, should be seen again.  Or can contact pcp if she has had this medication before from her pcp.

## 2024-03-19 NOTE — TELEPHONE ENCOUNTER
Called patient no answer left msg informing patient per UC provider she would need to be seen again to evaluate symptoms, or she could send a message to her pcp to see if she is willing to send script.   Osei Edmonds MA      No

## 2024-06-03 ENCOUNTER — TRANSFERRED RECORDS (OUTPATIENT)
Dept: HEALTH INFORMATION MANAGEMENT | Facility: CLINIC | Age: 49
End: 2024-06-03
Payer: COMMERCIAL

## 2024-06-03 LAB
ALT SERPL-CCNC: 14 IU/L (ref 5–35)
AST SERPL-CCNC: 18 U/L (ref 5–34)
CREATININE (EXTERNAL): 0.59 MG/DL (ref 0.5–1.3)

## 2024-06-22 ENCOUNTER — HEALTH MAINTENANCE LETTER (OUTPATIENT)
Age: 49
End: 2024-06-22

## 2024-07-29 ENCOUNTER — TRANSFERRED RECORDS (OUTPATIENT)
Dept: HEALTH INFORMATION MANAGEMENT | Facility: CLINIC | Age: 49
End: 2024-07-29
Payer: COMMERCIAL

## 2024-08-13 ENCOUNTER — TELEPHONE (OUTPATIENT)
Dept: FAMILY MEDICINE | Facility: CLINIC | Age: 49
End: 2024-08-13
Payer: COMMERCIAL

## 2024-08-13 NOTE — TELEPHONE ENCOUNTER
Dawna from [facility] calling to update PCP on results from recent biopsies. They will be faxing over full pathology report to [phone]    Patient had biopsies completed on 7/29/24 6 biopsies completed, 4 significant results needing surgical intervention      1) L proximal posterior upper arm malignant melanoma w/ thickness 0.3 mm invasive with erika level 3, wide local excision scheduled 9/30/24   2) R superior upper back, lentigo maligna patient scheduled wide local excision 10/28/24  3) R superior upper back severely atypical moles bordering on melanoma NC2 wide local excision 11/18/24  4) L lateral breast area moderate atypical moles excision 12/16/24    Kat Solorzano RN on 8/13/2024 at 12:00 PM

## 2024-09-05 PROBLEM — C43.9 MELANOMA OF SKIN (H): Status: ACTIVE | Noted: 2024-09-05

## 2024-09-30 ENCOUNTER — TRANSFERRED RECORDS (OUTPATIENT)
Dept: HEALTH INFORMATION MANAGEMENT | Facility: CLINIC | Age: 49
End: 2024-09-30
Payer: COMMERCIAL

## 2024-10-14 ENCOUNTER — TRANSFERRED RECORDS (OUTPATIENT)
Dept: HEALTH INFORMATION MANAGEMENT | Facility: CLINIC | Age: 49
End: 2024-10-14
Payer: COMMERCIAL

## 2024-10-14 LAB
ALT SERPL-CCNC: 13 IU/L (ref 6–32)
AST SERPL-CCNC: 19 U/L (ref 10–35)
CREATININE (EXTERNAL): 0.58 MG/DL (ref 0.5–1.05)

## 2024-10-30 ENCOUNTER — TRANSFERRED RECORDS (OUTPATIENT)
Dept: HEALTH INFORMATION MANAGEMENT | Facility: CLINIC | Age: 49
End: 2024-10-30
Payer: COMMERCIAL

## 2024-10-31 ENCOUNTER — TRANSFERRED RECORDS (OUTPATIENT)
Dept: HEALTH INFORMATION MANAGEMENT | Facility: CLINIC | Age: 49
End: 2024-10-31
Payer: COMMERCIAL

## 2024-11-18 ENCOUNTER — TRANSFERRED RECORDS (OUTPATIENT)
Dept: HEALTH INFORMATION MANAGEMENT | Facility: CLINIC | Age: 49
End: 2024-11-18
Payer: COMMERCIAL

## 2025-01-04 ENCOUNTER — HEALTH MAINTENANCE LETTER (OUTPATIENT)
Age: 50
End: 2025-01-04

## 2025-02-20 ENCOUNTER — TRANSFERRED RECORDS (OUTPATIENT)
Dept: HEALTH INFORMATION MANAGEMENT | Facility: CLINIC | Age: 50
End: 2025-02-20
Payer: COMMERCIAL

## 2025-02-20 LAB
ALT SERPL-CCNC: 19 IU/L (ref 6–32)
AST SERPL-CCNC: 18 U/L (ref 10–35)
CREATININE (EXTERNAL): 0.5 MG/DL (ref 0.5–1.05)

## 2025-03-24 ENCOUNTER — TRANSFERRED RECORDS (OUTPATIENT)
Dept: HEALTH INFORMATION MANAGEMENT | Facility: CLINIC | Age: 50
End: 2025-03-24
Payer: COMMERCIAL

## 2025-03-25 ENCOUNTER — TRANSFERRED RECORDS (OUTPATIENT)
Dept: HEALTH INFORMATION MANAGEMENT | Facility: CLINIC | Age: 50
End: 2025-03-25
Payer: COMMERCIAL

## 2025-04-21 ENCOUNTER — TRANSFERRED RECORDS (OUTPATIENT)
Dept: HEALTH INFORMATION MANAGEMENT | Facility: CLINIC | Age: 50
End: 2025-04-21
Payer: COMMERCIAL

## 2025-05-13 ENCOUNTER — TRANSFERRED RECORDS (OUTPATIENT)
Dept: HEALTH INFORMATION MANAGEMENT | Facility: CLINIC | Age: 50
End: 2025-05-13
Payer: COMMERCIAL

## 2025-06-19 ENCOUNTER — TRANSFERRED RECORDS (OUTPATIENT)
Dept: HEALTH INFORMATION MANAGEMENT | Facility: CLINIC | Age: 50
End: 2025-06-19
Payer: COMMERCIAL

## 2025-06-19 LAB
ALT SERPL-CCNC: 21 IU/L (ref 6–32)
AST SERPL-CCNC: 22 U/L (ref 10–35)
CREATININE (EXTERNAL): 0.57 MG/DL (ref 0.5–1.05)

## 2025-06-30 ENCOUNTER — TRANSFERRED RECORDS (OUTPATIENT)
Dept: HEALTH INFORMATION MANAGEMENT | Facility: CLINIC | Age: 50
End: 2025-06-30
Payer: COMMERCIAL

## 2025-07-03 ENCOUNTER — OFFICE VISIT (OUTPATIENT)
Dept: URGENT CARE | Facility: URGENT CARE | Age: 50
End: 2025-07-03
Payer: COMMERCIAL

## 2025-07-03 ENCOUNTER — ANCILLARY PROCEDURE (OUTPATIENT)
Dept: GENERAL RADIOLOGY | Facility: CLINIC | Age: 50
End: 2025-07-03
Attending: PHYSICIAN ASSISTANT
Payer: COMMERCIAL

## 2025-07-03 VITALS
OXYGEN SATURATION: 100 % | WEIGHT: 275 LBS | RESPIRATION RATE: 16 BRPM | HEART RATE: 61 BPM | TEMPERATURE: 97.5 F | DIASTOLIC BLOOD PRESSURE: 86 MMHG | BODY MASS INDEX: 40.03 KG/M2 | SYSTOLIC BLOOD PRESSURE: 146 MMHG

## 2025-07-03 DIAGNOSIS — M25.571 ACUTE RIGHT ANKLE PAIN: Primary | ICD-10-CM

## 2025-07-03 ASSESSMENT — ENCOUNTER SYMPTOMS
CHILLS: 0
PALPITATIONS: 0
NECK STIFFNESS: 0
HEADACHES: 0
SORE THROAT: 0
ALLERGIC/IMMUNOLOGIC NEGATIVE: 1
EYES NEGATIVE: 1
SHORTNESS OF BREATH: 0
WEAKNESS: 0
RHINORRHEA: 0
WOUND: 0
CARDIOVASCULAR NEGATIVE: 1
DIZZINESS: 0
RESPIRATORY NEGATIVE: 1
JOINT SWELLING: 1
BACK PAIN: 0
COUGH: 0
LIGHT-HEADEDNESS: 0
ENDOCRINE NEGATIVE: 1
FEVER: 0
ARTHRALGIAS: 0
NAUSEA: 0
MYALGIAS: 0
NECK PAIN: 0
VOMITING: 0
DIARRHEA: 0

## 2025-07-03 NOTE — PROGRESS NOTES
Urgent Care Clinic Visit    Chief Complaint   Patient presents with    Musculoskeletal Problem     Pt states that she has had some problems with her right ankle on Tuesday and then today she was walking and heard a popping noise which then followed with major pain, redness, and heat              7/3/2025     5:04 PM   Additional Questions   Roomed by Vinicio   Accompanied by self

## 2025-07-03 NOTE — PROGRESS NOTES
Chief Complaint:     Chief Complaint   Patient presents with     Musculoskeletal Problem     Pt states that she has had some problems with her right ankle on Tuesday and then today she was walking and heard a popping noise which then followed with major pain, redness, and heat      ASSESSMENT    1. Acute right ankle pain      The patient reports first noticing the swelling 2 days ago. Yesterday she was walking up the stairs, felt a pop in her ankle and shortly after the swelling became much worse. It is warm to the touch but not painful. Xray negative for acute fracture. The patient is afebrile and without a clear source of potential infection it is unlikely to be a septic arthritis. Ariane's clinical presentation is consistent with ligamentous injury.    PLAN    Patient is afebrile with stable vital signs.  The patient will keep a close eye for any signs of infection and will return to the ER or call 911 with fever, chills, worsening swelling, redness and warmth to touch.  XR of the R ankle was negative for any acute fracture per my read.  RICE discussed  Recommended rest and avoidance of activities which cause pain or swelling.  Pain relief: Acetaminophen and or Ibuprofen with food.  Patient verbalized understanding, and agrees with this plan.    31 minutes was spent by me on the date of the encounter doing chart review, history and exam, documentation and further activities per the note.      HPI: Ariane Reilly is an 49 year old female who presents today for evaluation of swelling to right ankle. The patient reports first noticing the swelling 2 days ago. Yesterday she was walking up the stairs, felt a pop in her ankle and shortly after the swelling became much worse. She denies any fever, chills, weakness or fatigue.    Patient denies any numbness, tingling, or dysfunction of the right ankle.    ROS:    Review of Systems   Constitutional:  Negative for chills and fever.   HENT:  Negative for  congestion, ear pain, rhinorrhea and sore throat.    Eyes: Negative.    Respiratory: Negative.  Negative for cough and shortness of breath.    Cardiovascular: Negative.  Negative for chest pain and palpitations.   Gastrointestinal:  Negative for diarrhea, nausea and vomiting.   Endocrine: Negative.    Genitourinary: Negative.    Musculoskeletal:  Positive for joint swelling. Negative for arthralgias, back pain, myalgias, neck pain and neck stiffness.   Skin: Negative.  Negative for rash and wound.   Allergic/Immunologic: Negative.  Negative for immunocompromised state.   Neurological:  Negative for dizziness, weakness, light-headedness and headaches.        Problem history  Patient Active Problem List   Diagnosis     Arthritis of left ankle     Rheumatoid arthritis of foot (H)     Dysmenorrhea     Rheumatoid arthritis, adult (H)     Benign essential hypertension     BMI>35     Melanoma of skin (H)        Allergies  Allergies   Allergen Reactions     No Known Allergies      Lisinopril Cough        Smoking History  History   Smoking Status     Never   Smokeless Tobacco     Never        Current Meds    Current Outpatient Medications:      ibuprofen (ADVIL/MOTRIN) 600 MG tablet, Take 1 tablet (600 mg) by mouth every 6 hours as needed for other (inflammatory pain), Disp: 60 tablet, Rfl: 0     levonorgestrel (MIRENA) 20 MCG/24HR IUD, Use one device, intrauterine, for up to 5 years., Disp: 1 each, Rfl: 0     ammonium lactate (LAC-HYDRIN) 12 % external lotion, MASSAGE INTO AFFECTED AREA ON ARMS ONCE DAILY., Disp: , Rfl:      cycloSPORINE (RESTASIS) 0.05 % ophthalmic emulsion, Place 1 drop into both eyes 2 times daily (Patient not taking: Reported on 7/3/2025), Disp: , Rfl:      folic acid (FOLVITE) 1 MG tablet, Take 1,000 mcg by mouth daily, Disp: , Rfl:      ketoconazole (NIZORAL) 2 % external shampoo, WASH TO AFFECTED AREA UNDER BREAST DAILY IN THE SHOWER LATHER AND LET SIT FOR SEVERAL MINUTES BEFORE RINSING (Patient not  taking: Reported on 7/3/2025), Disp: , Rfl:      methotrexate 50 MG/2ML injection, INJECT 0.8 MILLILITER (20MG) BY SUBCUTANEOUS ROUTE EVERY WEEK, START AS DIRECTED, Disp: , Rfl:      metroNIDAZOLE (METROCREAM) 0.75 % external cream, APPLY A THIN LAYER TO ENTIRE FACE ONCE DAILY (Patient not taking: Reported on 7/3/2025), Disp: , Rfl:      spironolactone (ALDACTONE) 50 MG tablet, Take 50 mg by mouth daily, Disp: , Rfl:      VITAMIN D, CHOLECALCIFEROL, PO, Take 2,000 Units by mouth daily (Patient not taking: Reported on 7/3/2025), Disp: , Rfl:      XELJANZ 5 MG tablet, , Disp: , Rfl:         Vital signs reviewed by Marito Ash PA-C  BP (!) 146/86 (BP Location: Right arm, Patient Position: Sitting)   Pulse 61   Temp 97.5  F (36.4  C) (Tympanic)   Resp 16   Wt 124.7 kg (275 lb)   SpO2 100%   BMI 40.03 kg/m      Physical Exam     Physical Exam  Vitals and nursing note reviewed.   Constitutional:       General: She is not in acute distress.     Appearance: She is well-developed. She is not ill-appearing, toxic-appearing or diaphoretic.   HENT:      Head: Normocephalic and atraumatic.      Right Ear: Tympanic membrane and external ear normal. No drainage, swelling or tenderness. Tympanic membrane is not perforated, erythematous, retracted or bulging.      Left Ear: Tympanic membrane and external ear normal. No drainage, swelling or tenderness. Tympanic membrane is not perforated, erythematous, retracted or bulging.      Nose: No mucosal edema, congestion or rhinorrhea.      Right Sinus: No maxillary sinus tenderness or frontal sinus tenderness.      Left Sinus: No maxillary sinus tenderness or frontal sinus tenderness.      Mouth/Throat:      Pharynx: No pharyngeal swelling, oropharyngeal exudate, posterior oropharyngeal erythema or uvula swelling.      Tonsils: No tonsillar abscesses.   Eyes:      Pupils: Pupils are equal, round, and reactive to light.   Neck:      Trachea: Trachea normal.   Cardiovascular:       Rate and Rhythm: Normal rate and regular rhythm.      Heart sounds: Normal heart sounds, S1 normal and S2 normal. No murmur heard.     No friction rub. No gallop.   Pulmonary:      Effort: Pulmonary effort is normal. No respiratory distress.      Breath sounds: Normal breath sounds. No decreased breath sounds, wheezing, rhonchi or rales.   Abdominal:      General: Bowel sounds are normal. There is no distension.      Palpations: Abdomen is soft. Abdomen is not rigid. There is no mass.      Tenderness: There is no abdominal tenderness. There is no guarding or rebound.   Musculoskeletal:      Cervical back: Full passive range of motion without pain, normal range of motion and neck supple.      Right lower leg: No deformity, lacerations, tenderness or bony tenderness.      Right ankle: Swelling present. No deformity. Tenderness present over the lateral malleolus. No medial malleolus tenderness. Normal range of motion. Normal pulse.      Right Achilles Tendon: No tenderness.   Lymphadenopathy:      Cervical: No cervical adenopathy.   Skin:     General: Skin is warm and dry.   Neurological:      Mental Status: She is alert and oriented to person, place, and time.      Cranial Nerves: No cranial nerve deficit.      Deep Tendon Reflexes: Reflexes are normal and symmetric.   Psychiatric:         Behavior: Behavior normal. Behavior is cooperative.         Thought Content: Thought content normal.         Judgment: Judgment normal.           Marito Ash PA-C  7/3/2025, 4:52 PM

## 2025-07-14 ENCOUNTER — TRANSFERRED RECORDS (OUTPATIENT)
Dept: HEALTH INFORMATION MANAGEMENT | Facility: CLINIC | Age: 50
End: 2025-07-14
Payer: COMMERCIAL

## 2025-07-28 ENCOUNTER — OFFICE VISIT (OUTPATIENT)
Dept: FAMILY MEDICINE | Facility: CLINIC | Age: 50
End: 2025-07-28
Payer: COMMERCIAL

## 2025-07-28 VITALS
HEART RATE: 60 BPM | DIASTOLIC BLOOD PRESSURE: 98 MMHG | HEIGHT: 69 IN | BODY MASS INDEX: 39.84 KG/M2 | RESPIRATION RATE: 20 BRPM | TEMPERATURE: 97.5 F | WEIGHT: 269 LBS | OXYGEN SATURATION: 100 % | SYSTOLIC BLOOD PRESSURE: 138 MMHG

## 2025-07-28 DIAGNOSIS — M05.9 RHEUMATOID ARTHRITIS WITH POSITIVE RHEUMATOID FACTOR, INVOLVING UNSPECIFIED SITE (H): ICD-10-CM

## 2025-07-28 DIAGNOSIS — Z30.432 ENCOUNTER FOR IUD REMOVAL: ICD-10-CM

## 2025-07-28 DIAGNOSIS — Z12.31 VISIT FOR SCREENING MAMMOGRAM: ICD-10-CM

## 2025-07-28 DIAGNOSIS — E66.01 MORBID OBESITY DUE TO EXCESS CALORIES (H): ICD-10-CM

## 2025-07-28 DIAGNOSIS — Z13.1 SCREENING FOR DIABETES MELLITUS: ICD-10-CM

## 2025-07-28 DIAGNOSIS — Z12.4 CERVICAL CANCER SCREENING: ICD-10-CM

## 2025-07-28 DIAGNOSIS — K59.00 CONSTIPATION, UNSPECIFIED CONSTIPATION TYPE: ICD-10-CM

## 2025-07-28 DIAGNOSIS — Z12.11 SCREEN FOR COLON CANCER: ICD-10-CM

## 2025-07-28 DIAGNOSIS — Z00.00 ROUTINE HISTORY AND PHYSICAL EXAMINATION OF ADULT: Primary | ICD-10-CM

## 2025-07-28 DIAGNOSIS — Z13.220 LIPID SCREENING: ICD-10-CM

## 2025-07-28 DIAGNOSIS — L65.9 HAIR THINNING: ICD-10-CM

## 2025-07-28 DIAGNOSIS — I10 BENIGN ESSENTIAL HYPERTENSION: ICD-10-CM

## 2025-07-28 DIAGNOSIS — N92.6 MENSTRUAL PROBLEM: ICD-10-CM

## 2025-07-28 DIAGNOSIS — E83.51 HYPOCALCEMIA: ICD-10-CM

## 2025-07-28 LAB
ERYTHROCYTE [DISTWIDTH] IN BLOOD BY AUTOMATED COUNT: 13.3 % (ref 10–15)
EST. AVERAGE GLUCOSE BLD GHB EST-MCNC: 105 MG/DL
HBA1C MFR BLD: 5.3 % (ref 0–5.6)
HCT VFR BLD AUTO: 41.3 % (ref 35–47)
HGB BLD-MCNC: 13.5 G/DL (ref 11.7–15.7)
MCH RBC QN AUTO: 29.5 PG (ref 26.5–33)
MCHC RBC AUTO-ENTMCNC: 32.7 G/DL (ref 31.5–36.5)
MCV RBC AUTO: 90 FL (ref 78–100)
PLATELET # BLD AUTO: 366 10E3/UL (ref 150–450)
RBC # BLD AUTO: 4.58 10E6/UL (ref 3.8–5.2)
WBC # BLD AUTO: 10.5 10E3/UL (ref 4–11)

## 2025-07-28 PROCEDURE — 3044F HG A1C LEVEL LT 7.0%: CPT | Performed by: PHYSICIAN ASSISTANT

## 2025-07-28 PROCEDURE — 36415 COLL VENOUS BLD VENIPUNCTURE: CPT | Performed by: PHYSICIAN ASSISTANT

## 2025-07-28 PROCEDURE — 3050F LDL-C >= 130 MG/DL: CPT | Performed by: PHYSICIAN ASSISTANT

## 2025-07-28 PROCEDURE — G0145 SCR C/V CYTO,THINLAYER,RESCR: HCPCS | Performed by: PHYSICIAN ASSISTANT

## 2025-07-28 PROCEDURE — 83036 HEMOGLOBIN GLYCOSYLATED A1C: CPT | Performed by: PHYSICIAN ASSISTANT

## 2025-07-28 PROCEDURE — 80053 COMPREHEN METABOLIC PANEL: CPT | Performed by: PHYSICIAN ASSISTANT

## 2025-07-28 PROCEDURE — 90715 TDAP VACCINE 7 YRS/> IM: CPT | Performed by: PHYSICIAN ASSISTANT

## 2025-07-28 PROCEDURE — 3080F DIAST BP >= 90 MM HG: CPT | Performed by: PHYSICIAN ASSISTANT

## 2025-07-28 PROCEDURE — 90471 IMMUNIZATION ADMIN: CPT | Performed by: PHYSICIAN ASSISTANT

## 2025-07-28 PROCEDURE — 1126F AMNT PAIN NOTED NONE PRSNT: CPT | Performed by: PHYSICIAN ASSISTANT

## 2025-07-28 PROCEDURE — 85027 COMPLETE CBC AUTOMATED: CPT | Performed by: PHYSICIAN ASSISTANT

## 2025-07-28 PROCEDURE — 80061 LIPID PANEL: CPT | Performed by: PHYSICIAN ASSISTANT

## 2025-07-28 PROCEDURE — 99396 PREV VISIT EST AGE 40-64: CPT | Mod: 25 | Performed by: PHYSICIAN ASSISTANT

## 2025-07-28 PROCEDURE — 87624 HPV HI-RISK TYP POOLED RSLT: CPT | Performed by: PHYSICIAN ASSISTANT

## 2025-07-28 PROCEDURE — 3075F SYST BP GE 130 - 139MM HG: CPT | Performed by: PHYSICIAN ASSISTANT

## 2025-07-28 PROCEDURE — 82306 VITAMIN D 25 HYDROXY: CPT | Performed by: PHYSICIAN ASSISTANT

## 2025-07-28 PROCEDURE — 84443 ASSAY THYROID STIM HORMONE: CPT | Performed by: PHYSICIAN ASSISTANT

## 2025-07-28 RX ORDER — UPADACITINIB 15 MG/1
15 TABLET, EXTENDED RELEASE ORAL DAILY
COMMUNITY

## 2025-07-28 RX ORDER — LIFITEGRAST 50 MG/ML
SOLUTION/ DROPS OPHTHALMIC
COMMUNITY
Start: 2025-05-06

## 2025-07-28 SDOH — HEALTH STABILITY: PHYSICAL HEALTH: ON AVERAGE, HOW MANY DAYS PER WEEK DO YOU ENGAGE IN MODERATE TO STRENUOUS EXERCISE (LIKE A BRISK WALK)?: 2 DAYS

## 2025-07-28 SDOH — HEALTH STABILITY: PHYSICAL HEALTH: ON AVERAGE, HOW MANY MINUTES DO YOU ENGAGE IN EXERCISE AT THIS LEVEL?: 60 MIN

## 2025-07-28 ASSESSMENT — PAIN SCALES - GENERAL: PAINLEVEL_OUTOF10: NO PAIN (0)

## 2025-07-28 ASSESSMENT — SOCIAL DETERMINANTS OF HEALTH (SDOH): HOW OFTEN DO YOU GET TOGETHER WITH FRIENDS OR RELATIVES?: TWICE A WEEK

## 2025-07-28 NOTE — PROGRESS NOTES
Preventive Care Visit  [facility]  SESAR ORANTES PA-C, Physician Assistant - Medical  Jul 28, 2025      Assessment & Plan     Routine history and physical examination of adult  Recommend twice yearly dental exams  Recommend every 2 year eye exams, annually if wearing corrective lenses  Recommend Calcium 1000mg daily either obtained in a 500mg twice daily supplement or through diet (or a combination of both).   Recommend 30-60 minutes cardiovascular exercise every day outside of usual activity and sveral days/week strength exercises.  Drink 40-60 ounces water daily  Try to get 4-5 servings fruits/vegetable daily  Eat quality lean proteins and high fiber whole foods  Try to stay away from processed or packaged foods    - CBC with platelets    F/U annually for wellness exam and in the interim as needed for problem visits.    Lipid screening  - Lipid Profile (Chol, Trig, HDL, LDL calc)    Screening for diabetes mellitus  - Hemoglobin A1c    Cervical cancer screening  - HPV and Gynecologic Cytology Panel - Recommended Age 30 - 65 Years  - Gynecologic Cytology (PAP)  Neg/Neg. F/U in 5 years for next PAP.     Visit for screening mammogram  - MA Screening Bilateral w/ True; Future    BMI>35  - TSH with free T4 reflex    Screen for colon cancer  - Colonoscopy Screening  Referral; Future    Rheumatoid arthritis with positive rheumatoid factor, involving unspecified site (H)  Has been following with Rheumatology.     Benign essential hypertension  Consider low dose ARB depending on lab results. See results documentation for follow up of this visit.   - Comprehensive metabolic panel (BMP + Alb, Alk Phos, ALT, AST, Total. Bili, TP)    Menstrual problem  Because patient is having periods even with IUD, I did recommend doing a pelvic US to make certain there is nothing abnormal going on. Unable to draw horomonal labs due to IUD presence, but can consider in the future if needed.   - US Pelvic  "Complete with Transvaginal; Future    Constipation, unspecified constipation type  - TSH with free T4 reflex    Hair thinning  - TSH with free T4 reflex    Hypocalcemia  Vit D added to labs due to a low serum calcium level.   - Vitamin D Deficiency; Future    Enc for IUD removal  Tolerated well.       BMI  Estimated body mass index is 39.44 kg/m  as calculated from the following:    Height as of this encounter: 1.759 m (5' 9.25\").    Weight as of this encounter: 122 kg (269 lb).   Weight management plan: Discussed. Offered referral to [facility]. Pt will think about it for now.     Counseling  Appropriate preventive services were addressed with this patient via screening, questionnaire, or discussion as appropriate for fall prevention, nutrition, physical activity, Tobacco-use cessation, social engagement, weight loss and cognition.  Checklist reviewing preventive services available has been given to the patient.  Reviewed patient's diet, addressing concerns and/or questions.   She is at risk for lack of exercise and has been provided with information to increase physical activity for the benefit of her well-being.   Reviewed preventive health counseling, as reflected in patient instructions       Regular exercise       Healthy diet/nutrition       Vision screening       Contraception       Osteoporosis prevention/bone health       Colorectal Cancer Screening      Subjective   Ariane is a 49 year old, presenting for the following:  Physical and Contraception (IUD removal)        7/28/2025     8:58 AM   Additional Questions   Roomed by jesu   Accompanied by self         7/28/2025     8:58 AM   Patient Reported Additional Medications   Patient reports taking the following new medications see chart          Well Women Physical Exam:    LMP: IUD. Patient would like this removed today. Has been having regular menses despite the Mirena placement. Also has some constipation, facial hair and hair on head thinning. Interested " in labs.   Fertility history: Had some infertility issues.   Birth Control: IUD in place.  STD Screening: No concerns    PAP/Cervical Cancer Screening: >5 years ago.   Mammogram: No fam hx breast cancer  Colon Cancer Screening: Great uncle (maternal) colon cancer  DXA: Criteria not yet met  Immunizations: Tdap due. Agreeable.     Cholesterol Screening: Due  Diabetes Screening: Due    Depression and Anxiety Screening: See below.     Other Concerns: When CoVID happened, Ariane's healthcare took a back seat. She is here today to address previous and current medical issues. She would like to talk about weight loss.  She would like her IUD removed. She had some infertility issues and does not believe she will become pregnant. She is due for PAP, mammogram and colonoscopy. Tried lisinopril for HTN and dry cough.Has no other issues.     Contraception        Advance Care Planning    Health Care Directive received at today's visit.  Forwarded to [email or web address].        7/28/2025   General Health   How would you rate your overall physical health? (!) FAIR   Feel stress (tense, anxious, or unable to sleep) Only a little   (!) STRESS CONCERN      7/28/2025   Nutrition   Three or more servings of calcium each day? Yes   Diet: Regular (no restrictions)   How many servings of fruit and vegetables per day? (!) 2-3   How many sweetened beverages each day? 0-1         7/28/2025   Exercise   Days per week of moderate/strenous exercise 2 days   Average minutes spent exercising at this level 60 min   (!) EXERCISE CONCERN      7/28/2025   Social Factors   Frequency of gathering with friends or relatives Twice a week   Worry food won't last until get money to buy more No   Food not last or not have enough money for food? No   Do you have housing? (Housing is defined as stable permanent housing and does not include staying outside in a car, in a tent, in an abandoned building, in an overnight shelter, or couch-surfing.) Yes   Are you  worried about losing your housing? No   Lack of transportation? No   Unable to get utilities (heat,electricity)? No         7/28/2025   Dental   Dentist two times every year? Yes         Today's PHQ-2 Score:       7/28/2025     8:39 AM   PHQ-2 ( 1999 [employer])   Q1: Little interest or pleasure in doing things 0   Q2: Feeling down, depressed or hopeless 0   PHQ-2 Score 0    Q1: Little interest or pleasure in doing things Not at all   Q2: Feeling down, depressed or hopeless Not at all   PHQ-2 Score 0       Patient-reported           7/28/2025   Substance Use   Alcohol more than 3/day or more than 7/wk No   Do you use any other substances recreationally? No     Social History     Tobacco Use    Smoking status: Never     Passive exposure: Never    Smokeless tobacco: Never   Vaping Use    Vaping status: Never Used   Substance Use Topics    Alcohol use: Yes     Comment: sporadically    Drug use: No        Mammogram Screening - Mammogram every 1-2 years updated in Health Maintenance based on mutual decision making          7/28/2025   One time HIV Screening   Previous HIV test? I don't know         7/28/2025   STI Screening   New sexual partner(s) since last STI/HIV test? No     History of abnormal Pap smear: No - age 21 - 65 - Patient with other risk factor requiring more frequent screening - see link Cervical Cytology Screening Guidelines        Latest Ref Rng & Units 7/28/2025     9:10 AM 9/8/2016     9:30 AM 9/8/2016    12:00 AM   PAP / HPV   PAP  Negative for Intraepithelial Lesion or Malignancy (NILM)      PAP (Historical)    NIL    HPV 16 DNA Negative Negative  Negative     HPV 18 DNA Negative Negative  Negative     Other HR HPV Negative Negative  Negative       ASCVD Risk   The 10-year ASCVD risk score (Carlito KASPER, et al., 2019) is: 0.9%    Values used to calculate the score:      Age: 49 years      Sex: Female      Is Non- : No      Diabetic: No      Tobacco smoker: No      Systolic  Blood Pressure: 138 mmHg      Is BP treated: No      HDL Cholesterol: 67 mg/dL      Total Cholesterol: 208 mg/dL        2025   Contraception/Family Planning   Questions about contraception or family planning No   What are your periods like? Regular     Reviewed and updated as needed this visit by Provider   Tobacco  Allergies  Meds  Problems  Med Hx  Surg Hx  Fam Hx            Past Medical History:   Diagnosis Date    Arthritis     rheumatoid arthritis    Benign essential hypertension 2018    Family history of diabetes mellitus type II     Gastro-oesophageal reflux disease     Melanoma of skin (H) 2024    PONV (postoperative nausea and vomiting)      Past Surgical History:   Procedure Laterality Date    ARTHRODESIS ANKLE Left 10/08/2015    Procedure: ARTHRODESIS ANKLE;  Surgeon: Krishna Segal MD;  Location: [facility]    ARTHRODESIS ANKLE Right 2018    Procedure: ARTHRODESIS ANKLE;  RIGHT SUBTALAR FUSION WITH RIGHT ILIAC CREST BONE GRAFTING ;  Surgeon: Krishna Segal MD;  Location: [facility]    GRAFT BONE FROM ILIAC CREST Left 10/08/2015    Procedure: GRAFT BONE FROM ILIAC CREST;  Surgeon: Krishna Segal MD;  Location: [facility]    GRAFT BONE FROM ILIAC CREST Right 2018    Procedure: GRAFT BONE FROM ILIAC CREST;;  Surgeon: Krishna Segal MD;  Location: [facility] OR    HC TOOTH EXTRACTION W/FORCEP      ORTHOPEDIC SURGERY  . , 3/07, , 10/8/2015    SYNOVECTOMIES BILATERAL FEET, AND RIGHT HAND, RIGHT RADIAL LUNATE FUSION.     OB History    Para Term  AB Living   2 0 0 0 2 0   SAB IAB Ectopic Multiple Live Births   2 0 0 0 0      # Outcome Date GA Lbr Teo/2nd Weight Sex Type Anes PTL Lv   2 SAB            1 SAB              BP Readings from Last 3 Encounters:   25 (!) 138/98   25 (!) 146/86   03/15/24 (!) 159/83    Wt Readings from Last 3 Encounters:   25 122 kg (269 lb)   25 124.7 kg (275 lb)   03/15/24 113.4 kg (250 lb)                   Patient Active Problem List   Diagnosis    Arthritis of left ankle    Rheumatoid arthritis of foot (H)    Dysmenorrhea    Rheumatoid arthritis, adult (H)    Benign essential hypertension    BMI>35    Melanoma of skin (H)    Screening for cervical cancer     Past Surgical History:   Procedure Laterality Date    ARTHRODESIS ANKLE Left 10/08/2015    Procedure: ARTHRODESIS ANKLE;  Surgeon: Krishna Segal MD;  Location: [facility]    ARTHRODESIS ANKLE Right 01/04/2018    Procedure: ARTHRODESIS ANKLE;  RIGHT SUBTALAR FUSION WITH RIGHT ILIAC CREST BONE GRAFTING ;  Surgeon: Krishna Segal MD;  Location: [facility]    GRAFT BONE FROM ILIAC CREST Left 10/08/2015    Procedure: GRAFT BONE FROM ILIAC CREST;  Surgeon: Krishna Segal MD;  Location: [facility]    GRAFT BONE FROM ILIAC CREST Right 01/04/2018    Procedure: GRAFT BONE FROM ILIAC CREST;;  Surgeon: Krishna Segal MD;  Location: [facility]    [facility]TRACTION W/[facility]      ORTHOPEDIC SURGERY  2/06. 2/07, 3/07, 7/07, 10/8/2015    SYNOVECTOMIES BILATERAL FEET, AND RIGHT HAND, RIGHT RADIAL LUNATE FUSION.       Social History     Tobacco Use    Smoking status: Never     Passive exposure: Never    Smokeless tobacco: Never   Substance Use Topics    Alcohol use: Yes     Comment: sporadically     Family History   Problem Relation Age of Onset    Hypertension Mother     Hyperlipidemia Mother     Asthma Mother     Diabetes Father     Arthritis Father     Other Cancer Father         Basal Cell Carcinoma    Obesity Father     Cancer - colorectal Other         great uncle on her mother's side    Diabetes Brother     Hypertension Brother     Hyperlipidemia Brother     Thyroid Disease Brother         Hypo, cancer    Obesity Sister     Obesity Brother          Current Outpatient Medications   Medication Sig Dispense Refill    ibuprofen (ADVIL/MOTRIN) 600 MG tablet Take 1 tablet (600 mg) by mouth every 6 hours as needed for other (inflammatory pain) 60 tablet 0    levonorgestrel  "(MIRENA) 20 MCG/24HR IUD Use one device, intrauterine, for up to 5 years. 1 each 0    upadacitinib ER (RINVOQ) 15 MG tablet Take 15 mg by mouth daily.      XIIDRA 5 % opthalmic solution INSTILL 1 DROP INTO BOTH EYES TWICE A DAY 12 HOURS APART      ammonium lactate (LAC-HYDRIN) 12 % external lotion MASSAGE INTO AFFECTED AREA ON ARMS ONCE DAILY. (Patient not taking: Reported on 7/28/2025)      cycloSPORINE (RESTASIS) 0.05 % ophthalmic emulsion Place 1 drop into both eyes 2 times daily (Patient not taking: Reported on 7/28/2025)      ketoconazole (NIZORAL) 2 % external shampoo WASH TO AFFECTED AREA UNDER BREAST DAILY IN THE SHOWER LATHER AND LET SIT FOR SEVERAL MINUTES BEFORE RINSING (Patient not taking: Reported on 7/28/2025)      metroNIDAZOLE (METROCREAM) 0.75 % external cream APPLY A THIN LAYER TO ENTIRE FACE ONCE DAILY (Patient not taking: Reported on 7/28/2025)      VITAMIN D, CHOLECALCIFEROL, PO Take 2,000 Units by mouth daily (Patient not taking: Reported on 7/28/2025)      XELJANZ 5 MG tablet  (Patient not taking: Reported on 7/28/2025)       Allergies   Allergen Reactions    No Known Allergies     Lisinopril Cough     Recent Labs   Lab Test 07/28/25  1229 09/20/18  0851 12/14/17  0000   A1C 5.3  --   --    * 141*  --    HDL 67 54  --    TRIG 50 56  --    ALT 14  --  25   CR 0.68 0.64 0.536   GFRESTIMATED >90 >90 134.0   GFRESTBLACK  --  >90  --    POTASSIUM 4.0 4.5  --    TSH 1.64 2.28  --         Review of Systems  Constitutional, HEENT, cardiovascular, pulmonary, GI, , musculoskeletal, neuro, skin, endocrine and psych systems are negative, except as otherwise noted.       Objective    Exam  BP (!) 138/98   Pulse 60   Temp 97.5  F (36.4  C) (Tympanic)   Resp 20   Ht 1.759 m (5' 9.25\")   Wt 122 kg (269 lb)   SpO2 100%   BMI 39.44 kg/m     Estimated body mass index is 39.44 kg/m  as calculated from the following:    Height as of this encounter: 1.759 m (5' 9.25\").    Weight as of this " encounter: 122 kg (269 lb).    Physical Exam  Vitals and nursing note reviewed.   Constitutional:       General: She is awake.      Appearance: Normal appearance. She is well-developed. She is not ill-appearing or toxic-appearing.   HENT:      Head: Normocephalic and atraumatic.      Jaw: No trismus.      Right Ear: Tympanic membrane, ear canal and external ear normal.      Left Ear: Tympanic membrane, ear canal and external ear normal.      Nose: Nose normal. No congestion or rhinorrhea.      Mouth/Throat:      Lips: Pink.      Mouth: Mucous membranes are moist.      Dentition: Normal dentition.      Tongue: No lesions. Tongue does not deviate from midline.      Pharynx: Oropharynx is clear. Uvula midline. No oropharyngeal exudate or posterior oropharyngeal erythema.   Eyes:      General: Lids are normal. No allergic shiner.     Extraocular Movements: Extraocular movements intact.      Conjunctiva/sclera: Conjunctivae normal.      Pupils: Pupils are equal, round, and reactive to light.   Neck:      Thyroid: No thyroid mass, thyromegaly or thyroid tenderness.      Trachea: Trachea normal.   Cardiovascular:      Rate and Rhythm: Normal rate and regular rhythm.      Pulses:           Posterior tibial pulses are 2+ on the right side and 2+ on the left side.      Heart sounds: Normal heart sounds. No murmur heard.  Pulmonary:      Effort: Pulmonary effort is normal.      Breath sounds: Normal breath sounds and air entry.   Abdominal:      General: Bowel sounds are normal.      Palpations: Abdomen is soft.      Tenderness: There is no abdominal tenderness.      Hernia: No hernia is present.   Genitourinary:     General: Normal vulva.      Vagina: Normal.      Cervix: Normal.      Comments:  IUD strings visible. IUD removed (was a little more difficult to remove than anticipated but did come out intact).   Musculoskeletal:      Cervical back: Normal range of motion.      Right lower leg: No edema.      Left lower leg: No  edema.      Comments: Ambulatory without assistive device  Limbs with grossly normal AROM   Lymphadenopathy:      Head:      Right side of head: No submental, submandibular, tonsillar, preauricular or posterior auricular adenopathy.      Left side of head: No submental, submandibular, tonsillar, preauricular or posterior auricular adenopathy.      Cervical: No cervical adenopathy.      Right cervical: No superficial cervical adenopathy.     Left cervical: No superficial cervical adenopathy.      Upper Body:      Right upper body: No supraclavicular adenopathy.      Left upper body: No supraclavicular adenopathy.   Skin:     General: Skin is warm and dry.      Findings: No lesion, rash or wound.   Neurological:      General: No focal deficit present.      Mental Status: She is alert and oriented to person, place, and time.      Motor: Motor function is intact.      Coordination: Coordination is intact.      Gait: Gait is intact.      Deep Tendon Reflexes:      Reflex Scores:       Patellar reflexes are 2+ on the right side and 2+ on the left side.     Comments: CN II-XII grossly intact   Psychiatric:         Mood and Affect: Mood normal.         Speech: Speech normal.         Behavior: Behavior is cooperative.         Thought Content: Thought content normal.         Cognition and Memory: Cognition normal.         Judgment: Judgment normal.         Signed Electronically by: SESAR ORANTES PA-C

## 2025-07-28 NOTE — PROGRESS NOTES
Preventive Care Visit  [facility]  SESAR ORANTES PA-C, Physician Assistant - Medical  Jul 28, 2025  {Provider  Link to [facility] :736653}    {PROVIDER CHARTING PREFERENCE:615589}    Flavio Thakkar is a 49 year old, presenting for the following:  Physical and Contraception (IUD removal)        7/28/2025     8:58 AM   Additional Questions   Roomed by jesu   Accompanied by self         7/28/2025     8:58 AM   Patient Reported Additional Medications   Patient reports taking the following new medications see chart          HPI  ***   {MA/LPN/RN Pre-Provider Visit Orders- hCG/UA/Strep (Optional):787384}  {SUPERLIST (Optional):048133}  {additonal problems for provider to add (Optional):109776}  Advance Care Planning  {The storyboard will display whether the patient has ACP docs on file. Hover over the Code section in the storyboard to access the ACP documents. :130305}  {(AWV REQUIRED) Advance Care Planning Reviewed:406337}        7/28/2025   General Health   How would you rate your overall physical health? (!) FAIR   Feel stress (tense, anxious, or unable to sleep) Only a little   (!) STRESS CONCERN      7/28/2025   Nutrition   Three or more servings of calcium each day? Yes   Diet: Regular (no restrictions)   How many servings of fruit and vegetables per day? (!) 2-3   How many sweetened beverages each day? 0-1         7/28/2025   Exercise   Days per week of moderate/strenous exercise 2 days   Average minutes spent exercising at this level 60 min   (!) EXERCISE CONCERN      7/28/2025   Social Factors   Frequency of gathering with friends or relatives Twice a week   Worry food won't last until get money to buy more No   Food not last or not have enough money for food? No   Do you have housing? (Housing is defined as stable permanent housing and does not include staying outside in a car, in a tent, in an abandoned building, in an overnight shelter, or couch-surfing.) Yes   Are you  worried about losing your housing? No   Lack of transportation? No   Unable to get utilities (heat,electricity)? No         7/28/2025   Dental   Dentist two times every year? Yes         Today's PHQ-2 Score:       7/28/2025     8:39 AM   PHQ-2 ( 1999 [employer])   Q1: Little interest or pleasure in doing things 0   Q2: Feeling down, depressed or hopeless 0   PHQ-2 Score 0    Q1: Little interest or pleasure in doing things Not at all   Q2: Feeling down, depressed or hopeless Not at all   PHQ-2 Score 0       Patient-reported           7/28/2025   Substance Use   Alcohol more than 3/day or more than 7/wk No   Do you use any other substances recreationally? No     Social History     Tobacco Use    Smoking status: Never     Passive exposure: Never    Smokeless tobacco: Never   Vaping Use    Vaping status: Never Used   Substance Use Topics    Alcohol use: Yes     Comment: sporadically    Drug use: No     {Provider  If there are gaps in the social history shown above, please follow the link to update and then refresh the note Link to Social and Substance History :476194}     {Mammogram Decision Support (Optional):873581}          7/28/2025   One time HIV Screening   Previous HIV test? I don't know         7/28/2025   STI Screening   New sexual partner(s) since last STI/HIV test? No     History of abnormal Pap smear: { :121896}        Latest Ref Rng & Units 9/8/2016     9:30 AM 9/8/2016    12:00 AM   PAP / HPV   PAP (Historical)   NIL    HPV 16 DNA NEG Negative     HPV 18 DNA NEG Negative     Other HR HPV NEG Negative       ASCVD Risk   The ASCVD Risk score (Carlito KASPER, et al., 2019) failed to calculate for the following reasons:    Cannot find a previous HDL lab    Cannot find a previous total cholesterol lab        7/28/2025   Contraception/Family Planning   Questions about contraception or family planning No   What are your periods like? Regular     {Provider  REQUIRED FOR AWV Use the storyboard to review patient  "history, after sections have been marked as reviewed, refresh note to capture documentation:837314}   Reviewed and updated as needed this visit by Provider   Tobacco  Allergies  Meds  Problems  Med Hx  Surg Hx  Fam Hx            {HISTORY OPTIONS (Optional):983474}    {ROS Picklists (Optional):920475}     Objective    Exam  There were no vitals taken for this visit.   Estimated body mass index is 40.03 kg/m  as calculated from the following:    Height as of 5/17/22: 1.765 m (5' 9.5\").    Weight as of 7/3/25: 124.7 kg (275 lb).    Physical Exam  {Exam Choices (Optional):580631}        Signed Electronically by: SESAR ORANTES PA-C  {Email feedback regarding this note to [email or web address]   :365917}  "

## 2025-07-29 ENCOUNTER — PATIENT OUTREACH (OUTPATIENT)
Dept: CARE COORDINATION | Facility: CLINIC | Age: 50
End: 2025-07-29
Payer: COMMERCIAL

## 2025-07-29 LAB
ALBUMIN SERPL BCG-MCNC: 4.1 G/DL (ref 3.5–5.2)
ALP SERPL-CCNC: 61 U/L (ref 40–150)
ALT SERPL W P-5'-P-CCNC: 14 U/L (ref 0–50)
ANION GAP SERPL CALCULATED.3IONS-SCNC: 11 MMOL/L (ref 7–15)
AST SERPL W P-5'-P-CCNC: 18 U/L (ref 0–45)
BILIRUB SERPL-MCNC: 0.3 MG/DL
BUN SERPL-MCNC: 11.9 MG/DL (ref 6–20)
CALCIUM SERPL-MCNC: 8.5 MG/DL (ref 8.8–10.4)
CHLORIDE SERPL-SCNC: 104 MMOL/L (ref 98–107)
CHOLEST SERPL-MCNC: 208 MG/DL
CREAT SERPL-MCNC: 0.68 MG/DL (ref 0.51–0.95)
EGFRCR SERPLBLD CKD-EPI 2021: >90 ML/MIN/1.73M2
FASTING STATUS PATIENT QL REPORTED: YES
FASTING STATUS PATIENT QL REPORTED: YES
GLUCOSE SERPL-MCNC: 90 MG/DL (ref 70–99)
HCO3 SERPL-SCNC: 24 MMOL/L (ref 22–29)
HDLC SERPL-MCNC: 67 MG/DL
HPV HR 12 DNA CVX QL NAA+PROBE: NEGATIVE
HPV16 DNA CVX QL NAA+PROBE: NEGATIVE
HPV18 DNA CVX QL NAA+PROBE: NEGATIVE
HUMAN PAPILLOMA VIRUS FINAL DIAGNOSIS: NORMAL
LDLC SERPL CALC-MCNC: 131 MG/DL
NONHDLC SERPL-MCNC: 141 MG/DL
POTASSIUM SERPL-SCNC: 4 MMOL/L (ref 3.4–5.3)
PROT SERPL-MCNC: 6.8 G/DL (ref 6.4–8.3)
SODIUM SERPL-SCNC: 139 MMOL/L (ref 135–145)
TRIGL SERPL-MCNC: 50 MG/DL
TSH SERPL DL<=0.005 MIU/L-ACNC: 1.64 UIU/ML (ref 0.3–4.2)

## 2025-07-30 PROBLEM — Z12.4 SCREENING FOR CERVICAL CANCER: Status: ACTIVE | Noted: 2025-07-30

## 2025-07-30 LAB
BKR AP ASSOCIATED HPV REPORT: NORMAL
BKR LAB AP GYN ADEQUACY: NORMAL
BKR LAB AP GYN INTERPRETATION: NORMAL
BKR LAB AP PREVIOUS ABNORMAL: NORMAL
PATH REPORT.COMMENTS IMP SPEC: NORMAL
PATH REPORT.COMMENTS IMP SPEC: NORMAL
PATH REPORT.RELEVANT HX SPEC: NORMAL

## 2025-07-31 LAB — VIT D+METAB SERPL-MCNC: 23 NG/ML (ref 20–50)

## 2025-08-02 ENCOUNTER — HEALTH MAINTENANCE LETTER (OUTPATIENT)
Age: 50
End: 2025-08-02

## 2025-08-04 ENCOUNTER — HOSPITAL ENCOUNTER (OUTPATIENT)
Dept: MAMMOGRAPHY | Facility: CLINIC | Age: 50
Discharge: HOME | End: 2025-08-04
Attending: PHYSICIAN ASSISTANT
Payer: COMMERCIAL

## 2025-08-04 ENCOUNTER — TRANSFERRED RECORDS (OUTPATIENT)
Dept: HEALTH INFORMATION MANAGEMENT | Facility: CLINIC | Age: 50
End: 2025-08-04

## 2025-08-04 ENCOUNTER — HOSPITAL ENCOUNTER (OUTPATIENT)
Dept: ULTRASOUND IMAGING | Facility: CLINIC | Age: 50
Discharge: HOME | End: 2025-08-04
Attending: PHYSICIAN ASSISTANT
Payer: COMMERCIAL

## 2025-08-04 DIAGNOSIS — Z12.31 VISIT FOR SCREENING MAMMOGRAM: ICD-10-CM

## 2025-08-04 DIAGNOSIS — N92.6 MENSTRUAL PROBLEM: ICD-10-CM

## 2025-08-04 PROCEDURE — 77063 BREAST TOMOSYNTHESIS BI: CPT

## 2025-08-04 PROCEDURE — 76856 US EXAM PELVIC COMPLETE: CPT

## (undated) DEVICE — CAST PADDING 4" STERILE 9044S

## (undated) DEVICE — CAST PADDING 4" COTTON WEBRIL UNSTERILE 9084

## (undated) DEVICE — SU PROLENE 3-0 PS-2 18" 8687H

## (undated) DEVICE — SPONGE RAY-TEC 4X8" 7318

## (undated) DEVICE — PIN GUIDE DEPUY ACE KYLE 3.2MMX9" THRD  14012-9

## (undated) DEVICE — DRAPE CASETTE RADIOLOGY 23X25" 1011

## (undated) DEVICE — TOURNIQUET CUFF 34" STERILE

## (undated) DEVICE — SU VICRYL 1 CTX CR 8X18" J765D

## (undated) DEVICE — DRAPE POUCH INSTRUMENT 1018

## (undated) DEVICE — BLADE SAW SAGITTAL LINVATEC 5023-118

## (undated) DEVICE — PREP SKIN SCRUB TRAY 4461A

## (undated) DEVICE — DRAPE IOBAN INCISE 23X17" 6650EZ

## (undated) DEVICE — DRAPE STERI TOWEL LG 1010

## (undated) DEVICE — CAST PADDING 4" UNSTERILE 9044

## (undated) DEVICE — SUCTION CANISTER MEDIVAC LINER 3000ML W/LID 65651-530

## (undated) DEVICE — BNDG ROLLER GAUZE CONFORM 4"X4YD 41-54

## (undated) DEVICE — BNDG ELASTIC 6" DBL LENGTH UNSTERILE 6611-16

## (undated) DEVICE — PACK EXTREMITY SOP15EXFSD

## (undated) DEVICE — GOWN XXLG REINFORCED 9071EL

## (undated) DEVICE — DRSG XEROFORM 1X8"

## (undated) DEVICE — SU VICRYL 2-0 X-1 27" UND J459H

## (undated) DEVICE — ESU GROUND PAD UNIVERSAL W/O CORD

## (undated) DEVICE — STPL SKIN 35W APPOSE 8886803712

## (undated) DEVICE — GLOVE PROTEXIS BLUE W/NEU-THERA 9.0  2D73EB90

## (undated) DEVICE — GLOVE PROTEXIS MICRO 8.5  2D73PM85

## (undated) DEVICE — DRAPE SHEET REV FOLD 3/4 9349

## (undated) DEVICE — SU VICRYL 0 CP-1 27" J467H

## (undated) DEVICE — DRAPE XRAY CASSETTE 20X23

## (undated) DEVICE — CAST PLASTER SPLINT 5X30" 7395

## (undated) DEVICE — IMM LIMB ELEVATOR DC40-0203

## (undated) DEVICE — DRSG GAUZE 4X4" 3033

## (undated) DEVICE — DRSG KERLIX FLUFFS X5

## (undated) DEVICE — SPONGE SURGIFOAM 50

## (undated) DEVICE — LINEN TOWEL PACK X5 5464

## (undated) RX ORDER — FENTANYL CITRATE 50 UG/ML
INJECTION, SOLUTION INTRAMUSCULAR; INTRAVENOUS
Status: DISPENSED
Start: 2018-01-04

## (undated) RX ORDER — CEFAZOLIN SODIUM 1 G/50ML
SOLUTION INTRAVENOUS
Status: DISPENSED
Start: 2018-01-04

## (undated) RX ORDER — BUPIVACAINE HYDROCHLORIDE 2.5 MG/ML
INJECTION, SOLUTION EPIDURAL; INFILTRATION; INTRACAUDAL
Status: DISPENSED
Start: 2018-01-04

## (undated) RX ORDER — KETOROLAC TROMETHAMINE 30 MG/ML
INJECTION, SOLUTION INTRAMUSCULAR; INTRAVENOUS
Status: DISPENSED
Start: 2018-01-04

## (undated) RX ORDER — PROPOFOL 10 MG/ML
INJECTION, EMULSION INTRAVENOUS
Status: DISPENSED
Start: 2018-01-04

## (undated) RX ORDER — DEXAMETHASONE SODIUM PHOSPHATE 4 MG/ML
INJECTION, SOLUTION INTRA-ARTICULAR; INTRALESIONAL; INTRAMUSCULAR; INTRAVENOUS; SOFT TISSUE
Status: DISPENSED
Start: 2018-01-04

## (undated) RX ORDER — SCOLOPAMINE TRANSDERMAL SYSTEM 1 MG/1
PATCH, EXTENDED RELEASE TRANSDERMAL
Status: DISPENSED
Start: 2018-01-04

## (undated) RX ORDER — CEFAZOLIN SODIUM 1 G/3ML
INJECTION, POWDER, FOR SOLUTION INTRAMUSCULAR; INTRAVENOUS
Status: DISPENSED
Start: 2018-01-04

## (undated) RX ORDER — HYDROMORPHONE HYDROCHLORIDE 1 MG/ML
INJECTION, SOLUTION INTRAMUSCULAR; INTRAVENOUS; SUBCUTANEOUS
Status: DISPENSED
Start: 2018-01-04

## (undated) RX ORDER — ONDANSETRON 2 MG/ML
INJECTION INTRAMUSCULAR; INTRAVENOUS
Status: DISPENSED
Start: 2018-01-04

## (undated) RX ORDER — LIDOCAINE HYDROCHLORIDE 20 MG/ML
INJECTION, SOLUTION EPIDURAL; INFILTRATION; INTRACAUDAL; PERINEURAL
Status: DISPENSED
Start: 2018-01-04